# Patient Record
Sex: FEMALE | Race: WHITE | URBAN - METROPOLITAN AREA
[De-identification: names, ages, dates, MRNs, and addresses within clinical notes are randomized per-mention and may not be internally consistent; named-entity substitution may affect disease eponyms.]

---

## 2018-09-21 ENCOUNTER — TRANSFERRED RECORDS (OUTPATIENT)
Dept: HEALTH INFORMATION MANAGEMENT | Facility: CLINIC | Age: 43
End: 2018-09-21

## 2020-01-23 LAB
ALT SERPL-CCNC: 10 U/L (ref 5–25)
AST SERPL-CCNC: 17 U/L (ref 13–39)
CREATININE (EXTERNAL): 1 MG/DL (ref 0.6–1.2)
GFR ESTIMATED (EXTERNAL): >=60 ML/MIN (ref 60–120)
GLUCOSE (EXTERNAL): 71 MG/DL (ref 70–99)
POTASSIUM (EXTERNAL): 4.2 MMOL/L (ref 3.5–5.1)

## 2020-02-26 LAB
ALT SERPL-CCNC: 8 U/L (ref 5–25)
AST SERPL-CCNC: 17 U/L (ref 13–39)
CREATININE (EXTERNAL): 0.9 MG/DL (ref 0.6–1.2)
GFR ESTIMATED (EXTERNAL): >=60 ML/MIN (ref 60–120)
GLUCOSE (EXTERNAL): 91 MG/DL (ref 70–99)
POTASSIUM (EXTERNAL): 3.7 MMOL/L (ref 3.5–5.1)

## 2020-05-26 LAB
ALT SERPL-CCNC: 9 U/L (ref 5–25)
AST SERPL-CCNC: 16 U/L (ref 13–39)
CREATININE (EXTERNAL): 0.9 MG/DL (ref 0.6–1.2)
GFR ESTIMATED (EXTERNAL): >=60 ML/MIN (ref 60–120)
GLUCOSE (EXTERNAL): 86 MG/DL (ref 70–99)
POTASSIUM (EXTERNAL): 3.9 MMOL/L (ref 3.5–5.1)

## 2020-07-29 ENCOUNTER — TRANSFERRED RECORDS (OUTPATIENT)
Dept: HEALTH INFORMATION MANAGEMENT | Facility: CLINIC | Age: 45
End: 2020-07-29

## 2020-07-29 LAB
ALT SERPL-CCNC: 10 U/L (ref 5–25)
AST SERPL-CCNC: 16 U/L (ref 13–39)
CREATININE (EXTERNAL): 0.8 MG/DL (ref 0.6–1.2)
GFR ESTIMATED (EXTERNAL): >=60 ML/MIN (ref 60–120)
GLUCOSE (EXTERNAL): 89 MG/DL (ref 70–99)
POTASSIUM (EXTERNAL): 3.5 MMOL/L (ref 3.5–5.1)

## 2021-10-14 ENCOUNTER — TRANSFERRED RECORDS (OUTPATIENT)
Dept: HEALTH INFORMATION MANAGEMENT | Facility: CLINIC | Age: 46
End: 2021-10-14
Payer: COMMERCIAL

## 2021-10-22 ENCOUNTER — TRANSCRIBE ORDERS (OUTPATIENT)
Dept: OTHER | Age: 46
End: 2021-10-22

## 2021-10-22 DIAGNOSIS — L66.10 LICHEN PLANOPILARIS: Primary | ICD-10-CM

## 2021-10-22 DIAGNOSIS — L66.12 FRONTAL FIBROSING ALOPECIA: ICD-10-CM

## 2022-01-03 NOTE — TELEPHONE ENCOUNTER
FUTURE VISIT INFORMATION      FUTURE VISIT INFORMATION:    Date: 4.1.22    Time: 9:15    [place] [facility] [place]FORMATION:    Referring provider:  Dr. 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 Coy  Path # [id number] Received  Sent to  Scanning. Also put in Dr. Aparicio's [facility] med records tab     Action 3.31.22 jm   Action Taken Called [facility]. They had me fax a records request to [phone]. Received records- sent to scanning. Also put in Dr. Aparicio's [facility] med records tab.

## 2022-04-01 ENCOUNTER — OFFICE VISIT (OUTPATIENT)
Dept: DERMATOLOGY | Facility: CLINIC | Age: 47
End: 2022-04-01
Payer: COMMERCIAL

## 2022-04-01 ENCOUNTER — PRE VISIT (OUTPATIENT)
Dept: DERMATOLOGY | Facility: CLINIC | Age: 47
End: 2022-04-01

## 2022-04-01 DIAGNOSIS — L30.9 DERMATITIS: Primary | ICD-10-CM

## 2022-04-01 DIAGNOSIS — L66.10 LICHEN PLANOPILARIS: ICD-10-CM

## 2022-04-01 PROCEDURE — 11901 INJECT SKIN LESIONS >7: CPT | Mod: GC | Performed by: DERMATOLOGY

## 2022-04-01 PROCEDURE — 99203 OFFICE O/P NEW LOW 30 MIN: CPT | Mod: 25 | Performed by: DERMATOLOGY

## 2022-04-01 RX ORDER — PREDNISOLONE ACETATE 10 MG/ML
SUSPENSION/ DROPS OPHTHALMIC
COMMUNITY
Start: 2021-12-06

## 2022-04-01 RX ORDER — FLUOCINONIDE TOPICAL SOLUTION USP, 0.05% 0.5 MG/ML
SOLUTION TOPICAL
COMMUNITY
Start: 2022-03-22 | End: 2024-06-14

## 2022-04-01 RX ORDER — FINASTERIDE 5 MG/1
TABLET, FILM COATED ORAL
COMMUNITY
Start: 2022-03-29 | End: 2023-05-19

## 2022-04-01 RX ORDER — FLUOCINOLONE ACETONIDE 0.11 MG/ML
OIL TOPICAL 2 TIMES DAILY
Qty: 60 ML | Refills: 5 | Status: SHIPPED | OUTPATIENT
Start: 2022-04-01 | End: 2022-04-01

## 2022-04-01 RX ORDER — HYDROXYCHLOROQUINE SULFATE 200 MG/1
TABLET, FILM COATED ORAL
COMMUNITY
Start: 2022-03-22 | End: 2023-10-06

## 2022-04-01 RX ORDER — TACROLIMUS 1 MG/G
OINTMENT TOPICAL
Qty: 60 G | Refills: 6 | Status: SHIPPED | OUTPATIENT
Start: 2022-04-01

## 2022-04-01 RX ORDER — VALACYCLOVIR HYDROCHLORIDE 500 MG/1
500 TABLET, FILM COATED ORAL DAILY
COMMUNITY
Start: 2022-02-11

## 2022-04-01 RX ORDER — KETOCONAZOLE 20 MG/ML
SHAMPOO TOPICAL DAILY PRN
Qty: 120 ML | Refills: 11 | Status: SHIPPED | OUTPATIENT
Start: 2022-04-01 | End: 2023-05-19

## 2022-04-01 RX ORDER — MINOXIDIL 2.5 MG/1
TABLET ORAL
Qty: 60 TABLET | Refills: 1 | Status: SHIPPED | OUTPATIENT
Start: 2022-04-01 | End: 2022-08-05

## 2022-04-01 ASSESSMENT — PAIN SCALES - GENERAL: PAINLEVEL: NO PAIN (0)

## 2022-04-01 NOTE — PATIENT INSTRUCTIONS
- Start ketoconazole 3x per week  - Start oral minoxidil 1.25mg daily, can stop topical minoxidil   - Start topical tacrolimus to scalp nightly during weekdays, fluocinonide solution on weekends   - Increase fluocinonide use if things are flaring as needed  - Continue all other oral medications  - Consider Excimer laser or repeat ILK injections at lower dose (2.5 or 5mg/cc) or isotretinoin/acitretin

## 2022-04-01 NOTE — LETTER
4/1/2022       RE: Maria Ines Coreas  [address] [place] [address]     Dear Colleague,    Thank you for referring your patient, Maria Ines Coreas, to the [facility] DERMATOLOGY CLINIC [place] at [facility]. Please see a copy of my visit note below.    [facility] Dermatology Note  Encounter Date: Apr 1, 2022  Office Visit     Dermatology Problem List:  1.  FFA/LPP. Biopsies: 2018.   - Current tx: finasteride, naltrexone, HCQ, oral minoxidil, ILK 2.5 04/01/22   - Adjunct tx: topical tacrolimus, ketoconazole, fluocinonide   - Future considerations: excimer, isotretinoin/ acitretin     ____________________________________________    Assessment & Plan:   1. 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   Patient with longstanding history of lichen plano pilaris/FFA.  Biopsy-proven in 2018 and has been on several systemic medication options as described below.  With progression involving eyebrows and body hair. Main disease activity primarily localized on the frontal scalp but overall well controlled.  Discussed etiology and natural history of her condition at length today, as well as treatment options and the rationale for use. Recommended initiation of ketoconazole shampoo to help treat scalp inflammation/scale, start topical tacrolimus. ILK today at lower concentration d/t c/f atrophy related changes. Will also start oral minoxidil to promote further hair regrowth. Patient agreeable to plan.  - Start ketoconazole 2% shampoo in the shower 3x/week.   - Start topical tacrolimus to scalp given concern for topical CS SE  - Continue topical fluocinonide --> on weekends  - Start oral minoxidil 1.25mg daily, SE of hypertrichosis, swelling, and lightheadedness discussed.  - ILK today, see procedure note below  - Continue finasteride  - Continue low dose naltrexone  - Continue hydroxychloroquine  -  Discussed other options including excimer laser, oral isotretinoin      Procedures Performed:   - Intra-lesional triamcinolone procedure note. After positioning and cleansing with isopropyl alcohol, 1 total mL of triamcinolone 2.5 mg/mL was injected into 10 lesion(s) on the scalp. The patient tolerated the procedure well and left the dermatology clinic in good condition.      Follow-up: 4 month(s) in-person, or earlier for new or changing lesions    Staff and Resident:     Arvin Hurd MD  PGY-2 Dermatology  Pager: [phone]     I have personally examined this patient and agree with the resident doctor's documentation and plan of care. I have reviewed and amended the resident's note above. The documentation accurately reflects my clinical observations, diagnoses, treatment and follow-up plans. I was present for key portions of the procedure.       Renee Aparicio MD  Dermatology Staff    ____________________________________________    CC: Hair Loss (pt staes she is here for a hair loss on going. X 5 years )    HPI:  Ms. Maria Ines Coreas is a 46 year old female who presents as a new patient for evaluation of hair loss.   - Reason for referral: LPP/FFA  - Onset of hair loss: 2018  - Affected areas: initially the temples then progressed to eyebrows and body hair  - Shedding or thinning, or both: both   - Percentage of hair loss: 50%  No Scalp pain   No Scalp burning   Yes Scalp itching    Yes Eyebrow changes    Yes, some thinning Eyelash changes   No Beard changes    Yes Other body hair changes    No Nail changes    No Additional symptoms? (please list below)     - Current treatments:    - finasteride 5mg   - low dose naltrexone   - /200mg alternate    - topical minoxidil evernight   - fluocinonide solution every night  - Prior discontinued treatments:    - stopped mycophenolate ~2 years ago, feeling shortness of breath   - Prior biopsies: 9/25/2018 (records available: yes)  - Scalp or hair care  habits/products:    - - - Which products? How many times per week? everyday, with trey    - - - Frequency of hair sprays, gels, dyes, heat styling, perms, weaves or extensions? n/a  - - - Sunscreen use on face or scalp? If so, what brand? Yes, whatever is on sale  - Menses: regular and without heavy flow post-menopausal: perimenopausal   - Unwanted hair growth/hirsutism: none  - Diet (meat, vegetarian, mixed): regular  - Recent weight loss: none  - Personal history of thyroid dysfunction or autoimmune disease: none  - Family history of hair loss: none  - Family history of autoimmune disease: none  - Major family, financial, school/work, or other social stressors in the few months prior to hair loss: none  - Overall things have been worsening, hair line is going back, thinning over the scalp  Patient is otherwise feeling well, in usual state of health, and has no additional skin concerns today.     Labs:      Physical Exam:  Vitals: There were no vitals taken for this visit.  GEN: Well developed, well-nourished, in no acute distress, in a pleasant mood.    SKIN: Focused examination of face and scalp was performed.  - Hernandez type: 1  - Last part width of 1.2/1.1/1.1  - Patchy erythema on frontal scalp with evidence of telangectasis  - Mild perifollicular erythema on frontal scalp  - Mild perifollicular scale on frontal scalp  - Mild loose scaling of the scalp   - Negative hair pull test   - Decreased eyelash density  - Normal to decreased eyebrow density  - No nail pitting or dystrophy   - No scalp folliculitis/pustules   - No other lesions of concern on areas examined.     Medications:  Current Outpatient Medications   Medication     finasteride (PROSCAR) 5 MG tablet     fluocinonide (LIDEX) 0.05 % external solution     hydroxychloroquine (PLAQUENIL) 200 MG tablet     prednisoLONE acetate (PRED FORTE) 1 % ophthalmic suspension     valACYclovir (VALTREX) 500 MG tablet     No current facility-administered  medications for this visit.      Past Medical History:   There is no problem list on file for this patient.    No past medical history on file.    CC Referred Self, MD  No address on file on close of this encounter.

## 2022-04-01 NOTE — NURSING NOTE
Drug Administration Record    Prior to injection, verified patient identity using patient's name and date of birth.  Due to injection administration, patient instructed to remain in clinic for 15 minutes  afterwards, and to report any adverse reaction to me immediately.    Drug Name: triamcinolone acetonide(kenalog)  Dose: 1mL of triamcinolone 2.5mg/mL  Route administered: ID  NDC #: Kenalog-10 (1119-3754-18)  Amount of waste(mL):4.75  Reason for waste: Single use vial    LOT #: [id number]  SITE: see providers notes   EXPIRATION DATE: may 2023

## 2022-06-17 ENCOUNTER — TELEPHONE (OUTPATIENT)
Dept: DERMATOLOGY | Facility: CLINIC | Age: 47
End: 2022-06-17
Payer: COMMERCIAL

## 2022-06-17 NOTE — TELEPHONE ENCOUNTER
[facility] Call Center    Phone Message    May a detailed message be left on voicemail: yes     Reason for Call: Other: Pt called and was wondering what is the next opening.  There is a chance the pt might be out of town. Per protocol, the clinic will need to review. Please call her back. Thanks     Action Taken: Message routed to:  [facility] DERM    Travel Screening: Not Applicable

## 2022-06-17 NOTE — TELEPHONE ENCOUNTER
Spoke with patient informed her of the next available appointment. She will be keeping her current appointment.    Amandeep Melgoza CMA

## 2022-08-05 ENCOUNTER — LAB (OUTPATIENT)
Dept: LAB | Facility: CLINIC | Age: 47
End: 2022-08-05
Payer: COMMERCIAL

## 2022-08-05 ENCOUNTER — OFFICE VISIT (OUTPATIENT)
Dept: DERMATOLOGY | Facility: CLINIC | Age: 47
End: 2022-08-05
Payer: COMMERCIAL

## 2022-08-05 DIAGNOSIS — L66.10 LICHEN PLANOPILARIS: Primary | ICD-10-CM

## 2022-08-05 DIAGNOSIS — L66.10 LICHEN PLANOPILARIS: ICD-10-CM

## 2022-08-05 DIAGNOSIS — Z51.81 MEDICATION MONITORING ENCOUNTER: ICD-10-CM

## 2022-08-05 LAB
ALBUMIN SERPL-MCNC: 4 G/DL (ref 3.4–5)
ALP SERPL-CCNC: 60 U/L (ref 40–150)
ALT SERPL W P-5'-P-CCNC: 20 U/L (ref 0–50)
ANION GAP SERPL CALCULATED.3IONS-SCNC: 9 MMOL/L (ref 3–14)
AST SERPL W P-5'-P-CCNC: 26 U/L (ref 0–45)
BASOPHILS # BLD AUTO: 0 10E3/UL (ref 0–0.2)
BASOPHILS NFR BLD AUTO: 1 %
BILIRUB SERPL-MCNC: 0.7 MG/DL (ref 0.2–1.3)
BUN SERPL-MCNC: 9 MG/DL (ref 7–30)
CALCIUM SERPL-MCNC: 9.1 MG/DL (ref 8.5–10.1)
CHLORIDE BLD-SCNC: 107 MMOL/L (ref 94–109)
CO2 SERPL-SCNC: 27 MMOL/L (ref 20–32)
CREAT SERPL-MCNC: 0.86 MG/DL (ref 0.52–1.04)
EOSINOPHIL # BLD AUTO: 0.1 10E3/UL (ref 0–0.7)
EOSINOPHIL NFR BLD AUTO: 1 %
ERYTHROCYTE [DISTWIDTH] IN BLOOD BY AUTOMATED COUNT: 12.9 % (ref 10–15)
GFR SERPL CREATININE-BSD FRML MDRD: 84 ML/MIN/1.73M2
GLUCOSE BLD-MCNC: 94 MG/DL (ref 70–99)
HCT VFR BLD AUTO: 38.3 % (ref 35–47)
HGB BLD-MCNC: 12.5 G/DL (ref 11.7–15.7)
IMM GRANULOCYTES # BLD: 0 10E3/UL
IMM GRANULOCYTES NFR BLD: 0 %
LYMPHOCYTES # BLD AUTO: 1.5 10E3/UL (ref 0.8–5.3)
LYMPHOCYTES NFR BLD AUTO: 35 %
MCH RBC QN AUTO: 28.7 PG (ref 26.5–33)
MCHC RBC AUTO-ENTMCNC: 32.6 G/DL (ref 31.5–36.5)
MCV RBC AUTO: 88 FL (ref 78–100)
MONOCYTES # BLD AUTO: 0.3 10E3/UL (ref 0–1.3)
MONOCYTES NFR BLD AUTO: 6 %
NEUTROPHILS # BLD AUTO: 2.6 10E3/UL (ref 1.6–8.3)
NEUTROPHILS NFR BLD AUTO: 57 %
NRBC # BLD AUTO: 0 10E3/UL
NRBC BLD AUTO-RTO: 0 /100
PLATELET # BLD AUTO: 273 10E3/UL (ref 150–450)
POTASSIUM BLD-SCNC: 3.9 MMOL/L (ref 3.4–5.3)
PROT SERPL-MCNC: 7.1 G/DL (ref 6.8–8.8)
RBC # BLD AUTO: 4.35 10E6/UL (ref 3.8–5.2)
SODIUM SERPL-SCNC: 143 MMOL/L (ref 133–144)
WBC # BLD AUTO: 4.5 10E3/UL (ref 4–11)

## 2022-08-05 PROCEDURE — 80053 COMPREHEN METABOLIC PANEL: CPT | Performed by: PATHOLOGY

## 2022-08-05 PROCEDURE — 36415 COLL VENOUS BLD VENIPUNCTURE: CPT | Performed by: PATHOLOGY

## 2022-08-05 PROCEDURE — 85025 COMPLETE CBC W/AUTO DIFF WBC: CPT | Performed by: PATHOLOGY

## 2022-08-05 PROCEDURE — 99214 OFFICE O/P EST MOD 30 MIN: CPT | Mod: 25 | Performed by: DERMATOLOGY

## 2022-08-05 PROCEDURE — 11900 INJECT SKIN LESIONS </W 7: CPT | Performed by: DERMATOLOGY

## 2022-08-05 RX ORDER — BIMATOPROST 0.3 MG/ML
SOLUTION/ DROPS OPHTHALMIC
Qty: 5 ML | Refills: 6 | Status: SHIPPED | OUTPATIENT
Start: 2022-08-05 | End: 2024-02-04

## 2022-08-05 RX ORDER — MINOXIDIL 2.5 MG/1
2.5 TABLET ORAL DAILY
Qty: 90 TABLET | Refills: 3 | Status: SHIPPED | OUTPATIENT
Start: 2022-08-05 | End: 2023-05-19

## 2022-08-05 ASSESSMENT — PAIN SCALES - GENERAL: PAINLEVEL: NO PAIN (0)

## 2022-08-05 NOTE — LETTER
8/5/2022       RE: Maria Ines Coreas  [address] [place] [address]     Dear Colleague,    Thank you for referring your patient, Maria Ines Coreas, to the [facility] DERMATOLOGY CLINIC [place] at [facility]. Please see a copy of my visit note below.    [facility] Dermatology Note  Encounter Date: Aug 5, 2022  Office Visit     Dermatology Problem List:  1.  FFA/LPP. Biopsies: 2018.   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   Patient with longstanding history of lichen plano pilaris/FFA.  Biopsy-proven in 2018 and has been on several systemic medication options as described below.  With progression involving eyebrows and body hair. Main disease activity primarily localized on the frontal scalp but overall well controlled.  Discussed etiology and future treatment options, including the rationale for use. Ketoconazole shampoo has been helping scalp inflammation/scale. Pt did not start topical tacrolimus last visit due to lack of coverage.  - ILK today as below in procedure note, can get subsequent injections at home derm clinic every 2 months   - Start latisse drops: apply to eyebrows daily  - Increase oral minoxidil to 2.5mg daily, no side effects noted  - Continue ketoconazole 2% shampoo in the shower 3x/week.   - Continue topical fluocinonide daily  - Continue finasteride  - Continue hydroxychloroquine: CBC, CMP today. Up to date on eye exam.  - Stop low dose naltrexone  - Discussed other options including excimer laser, oral isotretinoin       Procedures Performed:   - Intra-lesional triamcinolone procedure  note. After verbal consent and review of risk of pain and skin thinning/atrophy, positioning and cleansing with isopropyl alcohol, 1 total mL of triamcinolone 5 mg/mL was injected into 10 lesions on the frontal scalp. The patient tolerated the procedure well and left the dermatology clinic in good condition.  - Procedure(s) performed by faculty.     Follow-up: in 4 months in person with me, or earlier for new/changing lesions. Also with her dermatology clinic at home for Kenalog injections every 2 months.     Staff and Medical Student:     Nuria Worley, MS3    I was present with the medical student who participated in the service and in the documentation of the note. I have verified the history and personally performed physical exam and medical decision making. I agree with the assessment and plan of care as documented in the note. I performed the procedure(s).     Renee Aparicio MD   of Dermatology  [facility]    ____________________________________________    CC: Hair Loss (Maria Ines is here for a follow up on hair loss.  Still losing hair.)    HPI:  Ms. Maria Ines Coreas is a 46 year old female who presents today as a return patient for follow up on hair loss. Last seen 4/1/22 and received ILK 2.5 mg, started ketoconazole shampoo, started oral minoxidil, started topical tacrolimus, and was continued on finasteride, low dose naltrexone, and hydroxychloroquine.     Maria Ines reports less flakiness and itching of her scalp with the ketoconazole shampoo. No patches or areas of hair loss but some ongoing shedding. Overall doesn't notice much improvement since her last visit, but does think the injections helped short-term. Her eyebrows are quite scarce, she has never tried using Latisse. She never got the topical tacrolimus due to lack of insurance coverage and the medication being expensive. Instead, she has been using the topical fluocinonide every day rather than just on the  weekends. She has been taking the oral minoxidil regularly and denies any side effects. She wonders if there is any value in keeping the low dose naltrexone because her insurance does not cover that either. No side effects from the Plaquenil either, hasn't had labs drawn in over a year as far as she can remember.     Patient is otherwise feeling well, without additional skin concerns.    Labs:  None reviewed.    Physical Exam:  Vitals: There were no vitals taken for this visit.  SKIN: Focused examination of the scalp was performed.  - area of mild perifollicular erythema and scale on frontal scalp   - decreased eyebrow density  - No other lesions of concern on areas examined.     Medications:  Current Outpatient Medications   Medication     finasteride (PROSCAR) 5 MG tablet     fluocinonide (LIDEX) 0.05 % external solution     hydroxychloroquine (PLAQUENIL) 200 MG tablet     ketoconazole (NIZORAL) 2 % external shampoo     minoxidil (LONITEN) 2.5 MG tablet     prednisoLONE acetate (PRED FORTE) 1 % ophthalmic suspension     tacrolimus (PROTOPIC) 0.1 % external ointment     valACYclovir (VALTREX) 500 MG tablet     Current Facility-Administered Medications   Medication     triamcinolone acetonide (KENALOG-10) injection 5 mg      Past Medical History:   There is no problem list on file for this patient.    No past medical history on file.     CC No referring provider defined for this encounter. on close of this encounter.

## 2022-08-05 NOTE — NURSING NOTE
Dermatology Rooming Note    Maria Ines Coreas's goals for this visit include:   Chief Complaint   Patient presents with     Hair Loss     Maria Ines is here for a follow up on hair loss.  Still losing hair.     Merced Clemons, CMA

## 2022-08-05 NOTE — PATIENT INSTRUCTIONS
Plan:  - stop naltrexone  - start latisse drops: apply to eyebrows  - continue ketoconazole 2% shampoo 3x weekly  - continue topical fluocinonide daily  - continue finasteride  - continue hydroxychloroquine  - increase minoxidil to 2.5 mg daily  - bloodwork done today: CBC, CMP  - follow up in 4 months  - contact at home dermatologist for Kenalog injections in 2 months

## 2022-08-05 NOTE — NURSING NOTE
Drug Administration Record    Prior to injection, verified patient identity using patient's name and date of birth.  Due to injection administration, patient instructed to remain in clinic for 15 minutes  afterwards, and to report any adverse reaction to me immediately.    Drug Name: triamcinolone acetonide(kenalog)  Dose: 2mL of triamcinolone 5mg/mL, 10mg dose  Route administered: ID  NDC #: Kenalog-10 ([id number])  Amount of waste(mL):4mL  Reason for waste: Multi dose vial    LOT #: [id number]   SITE: scalp  : [employer]  EXPIRATION DATE: 02/2024

## 2022-08-05 NOTE — PROGRESS NOTES
[facility] Dermatology Note  Encounter Date: Aug 5, 2022  Office Visit     Dermatology Problem List:  1.  FFA/LPP. Biopsies: 2018.   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   Patient with longstanding history of lichen plano pilaris/FFA.  Biopsy-proven in 2018 and has been on several systemic medication options as described below.  With progression involving eyebrows and body hair. Main disease activity primarily localized on the frontal scalp but overall well controlled.  Discussed etiology and future treatment options, including the rationale for use. Ketoconazole shampoo has been helping scalp inflammation/scale. Pt did not start topical tacrolimus last visit due to lack of coverage.  - ILK today as below in procedure note, can get subsequent injections at home derm clinic every 2 months   - Start latisse drops: apply to eyebrows daily  - Increase oral minoxidil to 2.5mg daily, no side effects noted  - Continue ketoconazole 2% shampoo in the shower 3x/week.   - Continue topical fluocinonide daily  - Continue finasteride  - Continue hydroxychloroquine: CBC, CMP today. Up to date on eye exam.  - Stop low dose naltrexone  - Discussed other options including excimer laser, oral isotretinoin       Procedures Performed:   - Intra-lesional triamcinolone procedure note. After verbal consent and review of risk of pain and skin thinning/atrophy, positioning and cleansing with isopropyl alcohol, 1 total mL of triamcinolone 5 mg/mL was injected into 10 lesions on the frontal scalp. The patient tolerated the procedure well and left the dermatology clinic in good condition.  - Procedure(s)  performed by faculty.     Follow-up: in 4 months in person with me, or earlier for new/changing lesions. Also with her dermatology clinic at home for Kenalog injections every 2 months.     Staff and Medical Student:     Nuria Worley, MS3    I was present with the medical student who participated in the service and in the documentation of the note. I have verified the history and personally performed physical exam and medical decision making. I agree with the assessment and plan of care as documented in the note. I performed the procedure(s).     Renee Aparicio MD   of Dermatology  [facility]    ____________________________________________    CC: Hair Loss (Maria Ines is here for a follow up on hair loss.  Still losing hair.)    HPI:  Ms. Maria Ines Coreas is a 46 year old female who presents today as a return patient for follow up on hair loss. Last seen 4/1/22 and received ILK 2.5 mg, started ketoconazole shampoo, started oral minoxidil, started topical tacrolimus, and was continued on finasteride, low dose naltrexone, and hydroxychloroquine.     Maria Ines reports less flakiness and itching of her scalp with the ketoconazole shampoo. No patches or areas of hair loss but some ongoing shedding. Overall doesn't notice much improvement since her last visit, but does think the injections helped short-term. Her eyebrows are quite scarce, she has never tried using Latisse. She never got the topical tacrolimus due to lack of insurance coverage and the medication being expensive. Instead, she has been using the topical fluocinonide every day rather than just on the weekends. She has been taking the oral minoxidil regularly and denies any side effects. She wonders if there is any value in keeping the low dose naltrexone because her insurance does not cover that either. No side effects from the Plaquenil either, hasn't had labs drawn in over a year as far as she can remember.     Patient is  otherwise feeling well, without additional skin concerns.    Labs:  None reviewed.    Physical Exam:  Vitals: There were no vitals taken for this visit.  SKIN: Focused examination of the scalp was performed.  - area of mild perifollicular erythema and scale on frontal scalp   - decreased eyebrow density  - No other lesions of concern on areas examined.     Medications:  Current Outpatient Medications   Medication     finasteride (PROSCAR) 5 MG tablet     fluocinonide (LIDEX) 0.05 % external solution     hydroxychloroquine (PLAQUENIL) 200 MG tablet     ketoconazole (NIZORAL) 2 % external shampoo     minoxidil (LONITEN) 2.5 MG tablet     prednisoLONE acetate (PRED FORTE) 1 % ophthalmic suspension     tacrolimus (PROTOPIC) 0.1 % external ointment     valACYclovir (VALTREX) 500 MG tablet     Current Facility-Administered Medications   Medication     triamcinolone acetonide (KENALOG-10) injection 5 mg      Past Medical History:   There is no problem list on file for this patient.    No past medical history on file.     CC No referring provider defined for this encounter. on close of this encounter.

## 2022-10-03 ENCOUNTER — HEALTH MAINTENANCE LETTER (OUTPATIENT)
Age: 47
End: 2022-10-03

## 2023-01-20 ENCOUNTER — OFFICE VISIT (OUTPATIENT)
Dept: DERMATOLOGY | Facility: CLINIC | Age: 48
End: 2023-01-20
Payer: COMMERCIAL

## 2023-01-20 VITALS — DIASTOLIC BLOOD PRESSURE: 67 MMHG | HEART RATE: 51 BPM | SYSTOLIC BLOOD PRESSURE: 124 MMHG

## 2023-01-20 DIAGNOSIS — L66.10 LICHEN PLANOPILARIS: Primary | ICD-10-CM

## 2023-01-20 DIAGNOSIS — Z51.81 MEDICATION MONITORING ENCOUNTER: ICD-10-CM

## 2023-01-20 PROCEDURE — 11901 INJECT SKIN LESIONS >7: CPT | Performed by: DERMATOLOGY

## 2023-01-20 PROCEDURE — 99213 OFFICE O/P EST LOW 20 MIN: CPT | Mod: 25 | Performed by: DERMATOLOGY

## 2023-01-20 RX ORDER — HYDROCORTISONE 2.5 %
CREAM (GRAM) TOPICAL
Qty: 30 G | Refills: 3 | Status: SHIPPED | OUTPATIENT
Start: 2023-01-20

## 2023-01-20 RX ORDER — CLOBETASOL PROPIONATE 0.5 MG/ML
SOLUTION TOPICAL
Qty: 50 ML | Refills: 3 | Status: SHIPPED | OUTPATIENT
Start: 2023-01-20 | End: 2023-05-19

## 2023-01-20 ASSESSMENT — PAIN SCALES - GENERAL: PAINLEVEL: NO PAIN (0)

## 2023-01-20 NOTE — LETTER
1/20/2023       RE: Maria Ines Coreas  [address] [place] [address]     Dear Colleague,    Thank you for referring your patient, Maria Ines Coreas, to the [facility] DERMATOLOGY CLINIC [place] at [facility]. Please see a copy of my visit note below.    Dermatology Problem List:  1.  FFA/LPP. Biopsies: 2018.   - Current tx: finasteride, HCQ, oral minoxidil  - ILK:  2.5mg on 4/01/22, 5mg on 8/5/22  - Adjunct tx: ketoconazole, fluocinonide, Latisse (brows)   - Future considerations: excimer, isotretinoin/acitretin      ____________________________________________    DERMATOLOGY CLINIC VISIT NOTE     ASSESSMENT AND PLAN:    1.  Frontal fibrosing alopecia lichen planopilaris: Currently active and flaring in the last 2 months.    -A total of 1 mL of intralesional Kenalog 5 mg/mL injected into 10 sites along the frontal hairline.    -The patient to continue Latisse to the eyebrow area.  I prescribed hydrocortisone 2.5% cream to be used up to twice daily on the eyebrows.    -Continue oral minoxidil at 2.5 mg daily.  No significant side effects noted.    -Continue ketoconazole 2% shampoo 3 times per week.  -Transition from fluocinonide to clobetasol solution nightly as needed for flares.    -Continue finasteride.    -Continue hydroxychloroquine.  Last labs in August.  -Start home narrow-band UVB.  We will order a handheld device to be used 2-3 times per week.    The patient to follow up in 4 months' time and see her local dermatologist in 2 months' time.    I have personally examined this patient and agree with the resident doctor's documentation and plan of care. I have reviewed and amended the resident's note above. The documentation accurately reflects my clinical observations, diagnoses, treatment and follow-up plans.     Renee Aparicio MD  Dermatology Staff    Renee Aparicio MD   of Dermatology  [facility]  [place]    _______________________________________  _______________________________________      HISTORY OF PRESENT ILLNESS:  Ms. Coreas is a 47-year-old female returning to Dermatology Clinic for ongoing treatment of her lichen planopilaris.  She states that she has had a flare over the last 2 months involving the frontocentral and left frontal scalp.  She has been using medications as prescribed.  She is interested in discussing ongoing treatment options.  She has not noticed increased facial hair related to the oral minoxidil.  No lightheadedness or dizziness.  No other side effects from medications.    PHYSICAL EXAMINATION:    GENERAL:  The patient is a healthy-appearing, 47-year-old female in no distress.  SKIN:  Exam included the face and the scalp.  Examination of the eyebrows shows decreased density with broken hair shafts.  Examination of the central and left frontal hairline with erythematous patches with follicularly based scaling. On the right frontal hairline, there is erythema and scale to a lesser degree.      Again, thank you for allowing me to participate in the care of your patient.      Sincerely,    Renee Aparicio MD

## 2023-01-20 NOTE — PROGRESS NOTES
Dermatology Problem List:  1.  FFA/LPP. Biopsies: 2018.   - Current tx: finasteride, HCQ, oral minoxidil  - ILK:  2.5mg on 4/01/22, 5mg on 8/5/22  - Adjunct tx: ketoconazole, fluocinonide, Latisse (brows)   - Future considerations: excimer, isotretinoin/acitretin      ____________________________________________    DERMATOLOGY CLINIC VISIT NOTE     ASSESSMENT AND PLAN:    1.  Frontal fibrosing alopecia lichen planopilaris: Currently active and flaring in the last 2 months.    -A total of 1 mL of intralesional Kenalog 5 mg/mL injected into 10 sites along the frontal hairline.    -The patient to continue Latisse to the eyebrow area.  I prescribed hydrocortisone 2.5% cream to be used up to twice daily on the eyebrows.    -Continue oral minoxidil at 2.5 mg daily.  No significant side effects noted.    -Continue ketoconazole 2% shampoo 3 times per week.  -Transition from fluocinonide to clobetasol solution nightly as needed for flares.    -Continue finasteride.    -Continue hydroxychloroquine.  Last labs in August.  -Start home narrow-band UVB.  We will order a handheld device to be used 2-3 times per week.    The patient to follow up in 4 months' time and see her local dermatologist in 2 months' time.    I have personally examined this patient and agree with the resident doctor's documentation and plan of care. I have reviewed and amended the resident's note above. The documentation accurately reflects my clinical observations, diagnoses, treatment and follow-up plans.     Renee Aparicio MD  Dermatology Staff    Renee Aparicio MD   of Dermatology  [facility]    _______________________________________  _______________________________________      HISTORY OF PRESENT ILLNESS:  Ms. Coreas is a 47-year-old female returning to Dermatology Clinic for ongoing treatment of her lichen planopilaris.  She states that she has had a flare over the last 2 months involving the frontocentral and left  frontal scalp.  She has been using medications as prescribed.  She is interested in discussing ongoing treatment options.  She has not noticed increased facial hair related to the oral minoxidil.  No lightheadedness or dizziness.  No other side effects from medications.    PHYSICAL EXAMINATION:    GENERAL:  The patient is a healthy-appearing, 47-year-old female in no distress.  SKIN:  Exam included the face and the scalp.  Examination of the eyebrows shows decreased density with broken hair shafts.  Examination of the central and left frontal hairline with erythematous patches with follicularly based scaling. On the right frontal hairline, there is erythema and scale to a lesser degree.

## 2023-01-20 NOTE — NURSING NOTE
Drug Administration Record    Prior to injection, verified patient identity using patient's name and date of birth.  Due to injection administration, patient instructed to remain in clinic for 15 minutes  afterwards, and to report any adverse reaction to me immediately.    Drug Name: triamcinolone acetonide(kenalog)  Dose: 1mL of triamcinolone 5mg/mL, 5mg dose  Route administered: ID  NDC #: Kenalog-10 (9851-4044-57)  Amount of waste(mL):4.5mL  Reason for waste: Multi dose vial    LOT #: [id number]  SITE: see provider's note  : [employer]  EXPIRATION DATE: 08/2024

## 2023-05-19 ENCOUNTER — OFFICE VISIT (OUTPATIENT)
Dept: DERMATOLOGY | Facility: CLINIC | Age: 48
End: 2023-05-19
Payer: COMMERCIAL

## 2023-05-19 ENCOUNTER — MYC MEDICAL ADVICE (OUTPATIENT)
Dept: DERMATOLOGY | Facility: CLINIC | Age: 48
End: 2023-05-19

## 2023-05-19 ENCOUNTER — TELEPHONE (OUTPATIENT)
Dept: DERMATOLOGY | Facility: CLINIC | Age: 48
End: 2023-05-19

## 2023-05-19 VITALS — HEART RATE: 60 BPM | SYSTOLIC BLOOD PRESSURE: 110 MMHG | DIASTOLIC BLOOD PRESSURE: 70 MMHG

## 2023-05-19 DIAGNOSIS — L66.10 LICHEN PLANOPILARIS: Primary | ICD-10-CM

## 2023-05-19 DIAGNOSIS — L30.9 DERMATITIS: ICD-10-CM

## 2023-05-19 PROCEDURE — 11901 INJECT SKIN LESIONS >7: CPT | Performed by: DERMATOLOGY

## 2023-05-19 RX ORDER — CLOBETASOL PROPIONATE 0.5 MG/ML
SOLUTION TOPICAL
Qty: 50 ML | Refills: 3 | Status: SHIPPED | OUTPATIENT
Start: 2023-05-19 | End: 2024-06-14

## 2023-05-19 RX ORDER — KETOCONAZOLE 20 MG/ML
SHAMPOO TOPICAL DAILY PRN
Qty: 120 ML | Refills: 11 | Status: SHIPPED | OUTPATIENT
Start: 2023-05-19 | End: 2024-06-14

## 2023-05-19 RX ORDER — FINASTERIDE 5 MG/1
TABLET, FILM COATED ORAL
Qty: 90 TABLET | Refills: 3 | Status: SHIPPED | OUTPATIENT
Start: 2023-05-19 | End: 2024-06-14

## 2023-05-19 RX ORDER — MINOXIDIL 2.5 MG/1
2.5 TABLET ORAL DAILY
Qty: 90 TABLET | Refills: 3 | Status: SHIPPED | OUTPATIENT
Start: 2023-05-19 | End: 2024-06-11

## 2023-05-19 ASSESSMENT — PAIN SCALES - GENERAL: PAINLEVEL: NO PAIN (0)

## 2023-05-19 NOTE — LETTER
5/19/2023       RE: Maria Ines Coreas  [address] [place] [place] [address]     Dear Colleague,    Thank you for referring your patient, Maria Ines Coreas, to the [facility] [place] at [facility]. Please see a copy of my visit note below.    Dermatology Problem List:  1.  FFA/LPP. Biopsies: 2018.   - Current tx: finasteride, HCQ, oral minoxidil  - ILK:  2.5mg on 4/01/22, 5mg on 8/5/22  - Adjunct tx: ketoconazole, fluocinonide, Latisse (brows)   - Future considerations: excimer, isotretinoin/acitretin   - nbUVB device not covered     ____________________________________________    DERMATOLOGY CLINIC VISIT NOTE     ASSESSMENT AND PLAN:    1.  Frontal fibrosing alopecia lichen planopilaris: Chronic and mildly active today. Small area of atrophy on the central frontal hairline. Good regrowth noted.     -A total of 1 mL of intralesional Kenalog 5 mg/mL injected into 10 sites along the frontal hairline and anterior vertex scalp  -Continue oral minoxidil at 2.5 mg daily.  No significant side effects noted.    -Continue ketoconazole 2% shampoo 3 times per week.   -Clobetasol solution nightly as needed for flares.    -Continue finasteride.    -Continue hydroxychloroquine.      The patient to follow up in 4 months' time and see her local dermatologist in 2 months' time.      Renee Aparicio MD   of Dermatology  [facility]    _______________________________________  _______________________________________      HISTORY OF PRESENT ILLNESS:  Ms. Coreas is a 47-year-old female returning to Dermatology Clinic for ongoing treatment of her lichen planopilaris.  Continues to follow with her local dermatologist for intralesional kenalog as well. Notes increased regrowth. Has some areas of atrophy.     PHYSICAL EXAMINATION:    GENERAL:  The patient is a healthy-appearing, 47-year-old female in no distress.  SKIN:  Exam  included the face and the scalp.  Examination of the eyebrows shows decreased density.  Examination of the central and left frontal hairline with erythematous patches with follicularly based scaling.  Extensive new fibers in the part 2-4 cm in length.   Small 3 mm patch of decreased density on the frontal anterior hairline with telangiectasia

## 2023-05-19 NOTE — TELEPHONE ENCOUNTER
[facility] Call Center    Phone Message    May a detailed message be left on voicemail: no     Reason for Call: Medication Question or concern regarding medication   Prescription Clarification  Name of Medication: minoxidil (LONITEN) 2.5 MG tablet  Prescribing Provider: Renee Aparicio MD   Pharmacy: [id number] - [place], [place] - [address] [facility]   What on the order needs clarification? Pharmacist states directions need to be clarified. Two sets of directions on Rx.     Action Taken: Message routed to:  [facility] Derm    Travel Screening: Not Applicable

## 2023-05-19 NOTE — NURSING NOTE
Dermatology Rooming Note    Maria Ines Coreas's goals for this visit include:   Chief Complaint   Patient presents with     Hair Loss     Frontal fibrosing alopecia lichen planopilaris: hasn't noticed any flaring, per patient     Letitia Roblero LPN

## 2023-05-19 NOTE — PROGRESS NOTES
Dermatology Problem List:  1.  FFA/LPP. Biopsies: 2018.   - Current tx: finasteride, HCQ, oral minoxidil  - ILK:  2.5mg on 4/01/22, 5mg on 8/5/22  - Adjunct tx: ketoconazole, fluocinonide, Latisse (brows)   - Future considerations: excimer, isotretinoin/acitretin   - nbUVB device not covered     ____________________________________________    DERMATOLOGY CLINIC VISIT NOTE     ASSESSMENT AND PLAN:    1.  Frontal fibrosing alopecia lichen planopilaris: Chronic and mildly active today. Small area of atrophy on the central frontal hairline. Good regrowth noted.     -A total of 1 mL of intralesional Kenalog 5 mg/mL injected into 10 sites along the frontal hairline and anterior vertex scalp  -Continue oral minoxidil at 2.5 mg daily.  No significant side effects noted.    -Continue ketoconazole 2% shampoo 3 times per week.   -Clobetasol solution nightly as needed for flares.    -Continue finasteride.    -Continue hydroxychloroquine.      The patient to follow up in 4 months' time and see her local dermatologist in 2 months' time.      Renee Aparicio MD   of Dermatology  [facility]    _______________________________________  _______________________________________      HISTORY OF PRESENT ILLNESS:  Ms. Coreas is a 47-year-old female returning to Dermatology Clinic for ongoing treatment of her lichen planopilaris.  Continues to follow with her local dermatologist for intralesional kenalog as well. Notes increased regrowth. Has some areas of atrophy.     PHYSICAL EXAMINATION:    GENERAL:  The patient is a healthy-appearing, 47-year-old female in no distress.  SKIN:  Exam included the face and the scalp.  Examination of the eyebrows shows decreased density.  Examination of the central and left frontal hairline with erythematous patches with follicularly based scaling.  Extensive new fibers in the part 2-4 cm in length.   Small 3 mm patch of decreased density on the frontal anterior hairline  with telangiectasia

## 2023-05-19 NOTE — NURSING NOTE
Drug Administration Record    Prior to injection, verified patient identity using patient's name and date of birth.  Due to injection administration, patient instructed to remain in clinic for 15 minutes  afterwards, and to report any adverse reaction to me immediately.    Drug Name: triamcinolone acetonide(kenalog)  Dose: 1mL of triamcinolone 5mg/mL, 5mg dose  Route administered: ID  NDC #: Kenalog-10 (4899-9043-70)  Amount of waste(mL):4.5mL  Reason for waste: Multi dose vial    LOT #: [id number]  SITE: See provider's notes  : [employer]  EXPIRATION DATE: 08/31/2024

## 2023-05-22 NOTE — TELEPHONE ENCOUNTER
Spoke to pharmacist, Cindi.  Let her know that patient should take 2.5 mg daily.    Merced Clemons, CMA

## 2023-05-22 NOTE — TELEPHONE ENCOUNTER
Dr. Aparicio- visit note says take 2.5 mg daily.  Prescription says to take 1.25 mg.  Just want to clarify before I call pharmacy back.      -Continue oral minoxidil at 2.5 mg daily.  No significant side effects noted.      Sig - Route: Take 1 tablet (2.5 mg) by mouth daily Take 1.25mg daily (1/2 tablet) - Oral

## 2023-06-01 ENCOUNTER — MYC MEDICAL ADVICE (OUTPATIENT)
Dept: DERMATOLOGY | Facility: CLINIC | Age: 48
End: 2023-06-01
Payer: COMMERCIAL

## 2023-09-19 NOTE — NURSING NOTE
Dermatology Rooming Note    Maria Ines Coreas's goals for this visit include:   Chief Complaint   Patient presents with     Hair Loss     Patient states that hair loss has worsened with it spreading to new areas on scalp.       Severiano Ward, EMT-B     Patient Specific Counseling (Will Not Stick From Patient To Patient): ***\\nRecommend topical steroid cream BID followed by moisturizer. Use a week at a time then week off. Repeat as needed.  Patient's father elects trial of OTC hydrocortisone first.  If this is not effective they will call for RX hydrocortisone. Detail Level: Zone

## 2023-10-06 ENCOUNTER — OFFICE VISIT (OUTPATIENT)
Dept: DERMATOLOGY | Facility: CLINIC | Age: 48
End: 2023-10-06
Payer: COMMERCIAL

## 2023-10-06 VITALS — SYSTOLIC BLOOD PRESSURE: 119 MMHG | DIASTOLIC BLOOD PRESSURE: 74 MMHG

## 2023-10-06 DIAGNOSIS — Z51.81 MEDICATION MONITORING ENCOUNTER: ICD-10-CM

## 2023-10-06 DIAGNOSIS — L66.10 LICHEN PLANOPILARIS: Primary | ICD-10-CM

## 2023-10-06 PROCEDURE — 11901 INJECT SKIN LESIONS >7: CPT | Performed by: DERMATOLOGY

## 2023-10-06 RX ORDER — HYDROXYCHLOROQUINE SULFATE 200 MG/1
200 TABLET, FILM COATED ORAL 2 TIMES DAILY
Qty: 180 TABLET | Refills: 3 | Status: SHIPPED | OUTPATIENT
Start: 2023-10-06 | End: 2024-06-14

## 2023-10-06 ASSESSMENT — PAIN SCALES - GENERAL: PAINLEVEL: NO PAIN (0)

## 2023-10-06 NOTE — NURSING NOTE
Drug Administration Record    Prior to injection, verified patient identity using patient's name and date of birth.  Due to injection administration, patient instructed to remain in clinic for 15 minutes  afterwards, and to report any adverse reaction to me immediately.    Drug Name: triamcinolone acetonide(kenalog)  Dose: 1mL of triamcinolone 5mg/mL, 5mg dose  Route administered: ID  NDC #: Kenalog-10 (3475-2343-94)  Amount of waste(mL):4.5mL  Reason for waste: Multi dose vial    LOT #: [id number]  SITE: see provider's note  : [employer]  EXPIRATION DATE: Nov 2025

## 2023-10-06 NOTE — LETTER
10/6/2023       RE: Maria Ines Coreas  [address] [place] [address]     Dear Colleague,    Thank you for referring your patient, Maria Ines Coreas, to the [facility] DERMATOLOGY [facility] [place] at [facility]. Please see a copy of my visit note below.    [facility] Dermatology Note  Encounter Date: Oct 6, 2023  Office Visit     Dermatology Problem List:  #. FFA/LPP. Biopsies: 2018.   - Current tx: finasteride, HCQ, oral minoxidil  - ILK:  2.5mg on 4/01/22, 5mg on 8/5/22, 10 mg on 10/06/23  - Adjunct tx: ketoconazole, fluocinonide, Latisse (brows)   - Future considerations: excimer, isotretinoin/acitretin   - nbUVB device not covered    ____________________________________________    Assessment & Plan:     #. FFA/LPP. Biopsies: 2018.   Disease more active and skin more inflamed than at last visit, so will try increasing plaquenil to 400 mg daily. Plan to continue to see her local dermatologist between visits here.  - IL-K injections performed today (see procedure note(s) below).  - Increase hydroxychloroquine to 200 mg twice a day   - Continue oral minoxidil at 2.5 mg daily.  No significant side effects noted.    - Continue ketoconazole 2% shampoo 3 times per week.   - Can try T-sal shampoo when not using ketoconazole  - Continue fluocinonide nightly  - Continue finasteride.        Procedures Performed:   - Intra-lesional triamcinolone procedure note. After verbal consent and review of risk of pain and skin thinning/atrophy, positioning and cleansing with isopropyl alcohol, 1 total mL of triamcinolone 10 mg/mL was injected into 10 lesion(s) on the scalp. The patient tolerated the procedure well and left the dermatology clinic in good condition.  - Performed by staff      Follow-up: 4 month(s) in-person, or earlier for new or changing lesions    Staff and Medical Student:  Patient seen and staffed with Dr. Aparicio.  Belia Baird,  MS3      I was present with the medical student who participated in the service and in the documentation of the note.  I have verified the history and personally performed the physical exam and medical decision making.  I agree with the assessment and plan of care as documented in the note. I performed the procedure(s).     Renee Aparicio MD   of Dermatology  [facility]    ____________________________________________    CC: Hair Loss (Maria Ines is here to follow up on her hairloss. She states that she thinks there has been more loss on the left side than normal. )    HPI:  Ms. Maria Ines Coreas is a(n) 47 year old female who presents today as a return patient for FFA/LPP. Last seen on 5/2023 where she received 1 mL of ILK 5 mg/mL.    Today she reports some increased hair loss near her part on the left side. She thinks it visibly looks thinner and has noticed more hair coming out in the shower. Still having scalp itching, no pain or burning.    Currently taking oral minoxidil (2.5 mg), finasteride (5 mg), and hydroxychloroquine (200 mg every day/BID every other day). Not noticing any side effects from her oral medications. Using fluocinonide nightly and ketoconazole shampoo maybe once a week. Using latisse to eyebrows. Is interested in more ILK today, last received about 2 months ago at her local dermatologist's office. Has noticed some atrophy of her scalp, not sure if related to injections or the disease process.    Patient is otherwise feeling well, without additional skin concerns.    Labs Reviewed:  N/A    Physical Exam:  Vitals: /74   SKIN: Focused examination of scalp, face, hands and nails was performed.  - perifollicular erythema and scale across frontal hairline, increased from previous visit  - diffuse thinning along bilateral temples, unchanged  - part width: 1.0  - prominent R temple vein, neutral  - depressed midline forehead vein  - diffuse thinning of  eyebrows, most prominent laterally  - eyelashes wnl  - nails are within normal limits. No pitting or onycholysis.   - No other lesions of concern on areas examined.     Medications:  Current Outpatient Medications   Medication    bimatoprost (LUMIGAN) 0.03 % ophthalmic solution    clobetasol (TEMOVATE) 0.05 % external solution    finasteride (PROSCAR) 5 MG tablet    fluocinonide (LIDEX) 0.05 % external solution    hydrocortisone 2.5 % cream    hydroxychloroquine (PLAQUENIL) 200 MG tablet    ketoconazole (NIZORAL) 2 % external shampoo    minoxidil (LONITEN) 2.5 MG tablet    prednisoLONE acetate (PRED FORTE) 1 % ophthalmic suspension    valACYclovir (VALTREX) 500 MG tablet    tacrolimus (PROTOPIC) 0.1 % external ointment     Current Facility-Administered Medications   Medication    triamcinolone acetonide (KENALOG-10) injection 10 mg    triamcinolone acetonide (KENALOG-10) injection 5 mg      Past Medical History:   There is no problem list on file for this patient.    No past medical history on file.

## 2023-10-06 NOTE — PROGRESS NOTES
[facility] Dermatology Note  Encounter Date: Oct 6, 2023  Office Visit     Dermatology Problem List:  #. FFA/LPP. Biopsies: 2018.   - Current tx: finasteride, HCQ, oral minoxidil  - ILK:  2.5mg on 4/01/22, 5mg on 8/5/22, 10 mg on 10/06/23  - Adjunct tx: ketoconazole, fluocinonide, Latisse (brows)   - Future considerations: excimer, isotretinoin/acitretin   - nbUVB device not covered    ____________________________________________    Assessment & Plan:     #. FFA/LPP. Biopsies: 2018.   Disease more active and skin more inflamed than at last visit, so will try increasing plaquenil to 400 mg daily. Plan to continue to see her local dermatologist between visits here.  - IL-K injections performed today (see procedure note(s) below).  - Increase hydroxychloroquine to 200 mg twice a day   - Continue oral minoxidil at 2.5 mg daily.  No significant side effects noted.    - Continue ketoconazole 2% shampoo 3 times per week.   - Can try T-sal shampoo when not using ketoconazole  - Continue fluocinonide nightly  - Continue finasteride.        Procedures Performed:   - Intra-lesional triamcinolone procedure note. After verbal consent and review of risk of pain and skin thinning/atrophy, positioning and cleansing with isopropyl alcohol, 1 total mL of triamcinolone 10 mg/mL was injected into 10 lesion(s) on the scalp. The patient tolerated the procedure well and left the dermatology clinic in good condition.  - Performed by staff      Follow-up: 4 month(s) in-person, or earlier for new or changing lesions    Staff and Medical Student:  Patient seen and staffed with Dr. Aparicio.  Belia Baird, MS3      I was present with the medical student who participated in the service and in the documentation of the note.  I have verified the history and personally performed the physical exam and medical decision making.  I agree with the assessment and plan of care as documented in the note. I performed the procedure(s).      Renee Aparicio MD   of Dermatology  [facility]    ____________________________________________    CC: Hair Loss (Maria Ines is here to follow up on her hairloss. She states that she thinks there has been more loss on the left side than normal. )    HPI:  Ms. Maria Ines Coreas is a(n) 47 year old female who presents today as a return patient for FFA/LPP. Last seen on 5/2023 where she received 1 mL of ILK 5 mg/mL.    Today she reports some increased hair loss near her part on the left side. She thinks it visibly looks thinner and has noticed more hair coming out in the shower. Still having scalp itching, no pain or burning.    Currently taking oral minoxidil (2.5 mg), finasteride (5 mg), and hydroxychloroquine (200 mg every day/BID every other day). Not noticing any side effects from her oral medications. Using fluocinonide nightly and ketoconazole shampoo maybe once a week. Using latisse to eyebrows. Is interested in more ILK today, last received about 2 months ago at her local dermatologist's office. Has noticed some atrophy of her scalp, not sure if related to injections or the disease process.    Patient is otherwise feeling well, without additional skin concerns.    Labs Reviewed:  N/A    Physical Exam:  Vitals: /74   SKIN: Focused examination of scalp, face, hands and nails was performed.  - perifollicular erythema and scale across frontal hairline, increased from previous visit  - diffuse thinning along bilateral temples, unchanged  - part width: 1.0  - prominent R temple vein, neutral  - depressed midline forehead vein  - diffuse thinning of eyebrows, most prominent laterally  - eyelashes wnl  - nails are within normal limits. No pitting or onycholysis.   - No other lesions of concern on areas examined.     Medications:  Current Outpatient Medications   Medication    bimatoprost (LUMIGAN) 0.03 % ophthalmic solution    clobetasol (TEMOVATE) 0.05 % external solution     finasteride (PROSCAR) 5 MG tablet    fluocinonide (LIDEX) 0.05 % external solution    hydrocortisone 2.5 % cream    hydroxychloroquine (PLAQUENIL) 200 MG tablet    ketoconazole (NIZORAL) 2 % external shampoo    minoxidil (LONITEN) 2.5 MG tablet    prednisoLONE acetate (PRED FORTE) 1 % ophthalmic suspension    valACYclovir (VALTREX) 500 MG tablet    tacrolimus (PROTOPIC) 0.1 % external ointment     Current Facility-Administered Medications   Medication    triamcinolone acetonide (KENALOG-10) injection 10 mg    triamcinolone acetonide (KENALOG-10) injection 5 mg      Past Medical History:   There is no problem list on file for this patient.    No past medical history on file.

## 2023-10-06 NOTE — PATIENT INSTRUCTIONS
Can try increasing plaquenil to 200 mg twice a day. Message if you need more medication.    Can try T-sal (over the counter salicylic acid) shampoo when not using ketoconazole.    [facility] (1st, 2nd, and 3rd Mondays of the month)  Located in: [facility]  Address: [address] [place] [address]  Phone: [phone]    Other providers at [facility]  Dr. Esquivel, Betito Iyer

## 2023-10-06 NOTE — NURSING NOTE
Dermatology Rooming Note    Maria Ines Coreas's goals for this visit include:   Chief Complaint   Patient presents with    Hair Loss     Maria Ines is here to follow up on her hairloss. She states that she thinks there has been more loss on the left side than normal.      Chelo Mace, visit facilitator

## 2023-10-22 ENCOUNTER — HEALTH MAINTENANCE LETTER (OUTPATIENT)
Age: 48
End: 2023-10-22

## 2024-02-04 ENCOUNTER — MYC REFILL (OUTPATIENT)
Dept: DERMATOLOGY | Facility: CLINIC | Age: 49
End: 2024-02-04
Payer: COMMERCIAL

## 2024-02-04 DIAGNOSIS — L66.10 LICHEN PLANOPILARIS: ICD-10-CM

## 2024-02-05 RX ORDER — BIMATOPROST 0.3 MG/ML
SOLUTION/ DROPS OPHTHALMIC
Qty: 5 ML | Refills: 6 | Status: SHIPPED | OUTPATIENT
Start: 2024-02-05

## 2024-02-05 NOTE — TELEPHONE ENCOUNTER
bimatoprost (LUMIGAN) 0.03 % ophthalmic solution       Last Written Prescription Date:  8-5-22  Last Fill Quantity: 5 ml,   # refills: 6  [facility]  Future Office visit:  2-20-24 Alvin Chin refill request to provider for review/approval because:  Med not on derm protocol

## 2024-02-20 ENCOUNTER — OFFICE VISIT (OUTPATIENT)
Dept: FAMILY MEDICINE | Facility: CLINIC | Age: 49
End: 2024-02-20
Payer: COMMERCIAL

## 2024-02-20 DIAGNOSIS — L66.10 LICHEN PLANOPILARIS: Primary | ICD-10-CM

## 2024-02-20 PROCEDURE — 11901 INJECT SKIN LESIONS >7: CPT | Performed by: DERMATOLOGY

## 2024-02-20 PROCEDURE — 99214 OFFICE O/P EST MOD 30 MIN: CPT | Mod: 25 | Performed by: DERMATOLOGY

## 2024-02-20 RX ORDER — DOXYCYCLINE 100 MG/1
100 CAPSULE ORAL 2 TIMES DAILY
Qty: 60 CAPSULE | Refills: 5 | Status: SHIPPED | OUTPATIENT
Start: 2024-02-20 | End: 2024-06-14

## 2024-02-20 NOTE — PROGRESS NOTES
[facility] Dermatology Note  Encounter Date: Feb 20, 2024  Office Visit     Dermatology Problem List:  1. FFA/LPP. Biopsies: 2018.   - Current tx: finasteride, HCQ, oral minoxidil, doxycycline, tacrolimus  - ILK:  2.5mg on 4/01/22, 5mg on 8/5/22, 10 mg on 10/06/23, 20 mg 02/20/24  - Adjunct tx: ketoconazole, fluocinonide, Latisse (brows)   - Future considerations: excimer, isotretinoin/acitretin   - nbUVB device not covered  - prior rx: low dose naltrexone    SHx: [place] in [place]  ____________________________________________    Assessment & Plan:    #. FFA/LPP. Biopsies: 2018. Photos fairly stable, however with ongoing symptoms of stinging and occasional pruritus, will upscale treatment. Discussed the risks and benefits of doxycycline, switch finasteride to dutasteride, tacrolimus solution, tofacitinib cream.  - Start doxycycline 100 mg BID. Counseled on side effects of GI upset and sun sensitivity..   - Start Tacrolimus 0.1% solution BID prn.  - IL-K injections performed today. See procedure note below.  - Continue hydroxychloroquine 200 mg twice a day   - Continue oral minoxidil at 2.5 mg daily.  No significant side effects noted.    - Continue finasteride.   - Continue ketoconazole 2% shampoo 3 times per week.   - Can try T-sal shampoo when not using ketoconazole   - Continue fluocinonide nightly   - last CBC, CMP wnl 8/5/22; need to ask about last eye exam    Procedures Performed:   - Intra-lesional triamcinolone procedure note. After verbal consent and review of risk of pain and skin thinning/atrophy, positioning and cleansing with isopropyl alcohol, 2 total mL of triamcinolone 10 mg/mL was injected into 20 lesion(s) on the scalp. The patient tolerated the procedure well and left the dermatology clinic in good condition.    Follow-up: 4 month(s) in-person as scheduled, or earlier for new or changing lesions    Staff and Scribe:     Scribe Disclosure:   DIANA THOMPSON, am  "serving as a scribe; to document services personally performed by Alana Esquivel MD -based on data collection and the provider's statements to me.    Provider Disclosure:   The documentation recorded by the scribe accurately reflects the services I personally performed and the decisions made by me.    Alana Esquivel MD    Department of Dermatology  [facility]: Phone: [phone], Fax: [phone]  2/23/2024     ____________________________________________    CC: Hair Loss    HPI:  Ms. Maria Ines Coreas is a(n) 48 year old female who presents today as a return patient for hair loss.    Previously seen by Dr. Aparicio. Still taking minoxidil, plaquenill 200 mg BID, and finasteride. Usually obtained injections every 2 months. Initially felt that ILK was helpful, but unable to tell now.     She feels HL has been progressing since her last visit in October 2023.    Has symptoms daily that she describes as \"stinging\". Pruritus does not occur daily.    Patient is otherwise feeling well, without additional skin concerns.    Labs Reviewed:  N/A    Physical Exam:  Vitals: There were no vitals taken for this visit.  SKIN: Focused examination of the scalp was performed.  - Erythema and PF scale most prominent on frontal vertex and frontal temporal hairline.   - Frontotemporal band of alopecia, with some maykel fibers of regrowth.  - No other lesions of concern on areas examined.     Medications:  Current Outpatient Medications   Medication    bimatoprost (LUMIGAN) 0.03 % ophthalmic solution    clobetasol (TEMOVATE) 0.05 % external solution    finasteride (PROSCAR) 5 MG tablet    fluocinonide (LIDEX) 0.05 % external solution    hydrocortisone 2.5 % cream    hydroxychloroquine (PLAQUENIL) 200 MG tablet    ketoconazole (NIZORAL) 2 % external shampoo    minoxidil (LONITEN) 2.5 MG tablet    prednisoLONE acetate (PRED FORTE) 1 % " ophthalmic suspension    tacrolimus (PROTOPIC) 0.1 % external ointment    valACYclovir (VALTREX) 500 MG tablet     Current Facility-Administered Medications   Medication    triamcinolone acetonide (KENALOG-10) injection 10 mg    triamcinolone acetonide (KENALOG-10) injection 5 mg      Past Medical History:   There is no problem list on file for this patient.    History reviewed. No pertinent past medical history.     CC Referred Self, MD  No address on file on close of this encounter.

## 2024-02-20 NOTE — PATIENT INSTRUCTIONS
Patient Education       Proper skin care from [facility]:    -Eliminate harsh soaps as they strip the natural oils from the skin, often resulting in dry itchy skin ( i.e. Dial, Zest, Urdu Spring)  -Use mild soaps such as Cetaphil or Dove Sensitive Skin in the shower. You do not need to use soap on arms, legs, and trunk every time you shower unless visibly soiled.   -Avoid hot or cold showers.  -After showering, lightly dry off and apply moisturizing within 2-3 minutes. This will help trap moisture in the skin.   -Aggressive use of a moisturizer at least 1-2 times a day to the entire body (including -Vanicream, Cetaphil, Aquaphor or Cerave) and moisturize hands after every washing.  -We recommend using moisturizers that come in a tub that needs to be scooped out, not a pump. This has more of an oil base. It will hold moisture in your skin much better than a water base moisturizer. The above recommended are non-pore clogging.      Wear a sunscreen with at least SPF 30 on your face, ears, neck and V of the chest daily. Wear sunscreen on other areas of the body if those areas are exposed to the sun throughout the day. Sunscreens can contain physical and/or chemical blockers. Physical blockers are less likely to clog pores, these include zinc oxide and titanium dioxide. Reapply every two hour and after swimming.     Sunscreen examples: https://www.ewg.org/sunscreen/    UV radiation  UVA radiation remains constant throughout the day and throughout the year. It is a longer wavelength than UVB and therefore penetrates deeper into the skin leading to immediate and delayed tanning, photoaging, and skin cancer. 70-80% of UVA and UVB radiation occurs between the hours of 10am-2pm.  UVB radiation  UVB radiation causes the most harmful effects and is more significant during the summer months. However, snow and ice can reflect UVB radiation leading to skin damage during the winter months as well. UVB radiation is  responsible for tanning, burning, inflammation, delayed erythema (pinkness), pigmentation (brown spots), and skin cancer.     I recommend self monthly full body exams and yearly full body exams with a dermatology provider. If you develop a new or changing lesion please follow up for examination. Most skin cancers are pink and scaly or pink and pearly. However, we do see blue/brown/black skin cancers.  Consider the ABCDEs of melanoma when giving yourself your monthly full body exam ( don't forget the groin, buttocks, feet, toes, etc). A-asymmetry, B-borders, C-color, D-diameter, E-elevation or evolving. If you see any of these changes please follow up in clinic. If you cannot see your back I recommend purchasing a hand held mirror to use with a larger wall mirror.       Checking for Skin Cancer  You can find cancer early by checking your skin each month. There are 3 kinds of skin cancer. They are melanoma, basal cell carcinoma, and squamous cell carcinoma. Doing monthly skin checks is the best way to find new marks or skin changes. Follow the instructions below for checking your skin.   The ABCDEs of checking moles for melanoma   Check your moles or growths for signs of melanoma using ABCDE:   Asymmetry: the sides of the mole or growth don t match  Border: the edges are ragged, notched, or blurred  Color: the color within the mole or growth varies  Diameter: the mole or growth is larger than 6 mm (size of a pencil eraser)  Evolving: the size, shape, or color of the mole or growth is changing (evolving is not shown in the images below)    Checking for other types of skin cancer  Basal cell carcinoma or squamous cell carcinoma have symptoms such as:     A spot or mole that looks different from all other marks on your skin  Changes in how an area feels, such as itching, tenderness, or pain  Changes in the skin's surface, such as oozing, bleeding, or scaliness  A sore that does not heal  New swelling or redness beyond  the border of a mole    Who s at risk?  Anyone can get skin cancer. But you are at greater risk if you have:   Fair skin, light-colored hair, or light-colored eyes  Many moles or abnormal moles on your skin  A history of sunburns from sunlight or tanning beds  A family history of skin cancer  A history of exposure to radiation or chemicals  A weakened immune system  If you have had skin cancer in the past, you are at risk for recurring skin cancer.   How to check your skin  Do your monthly skin checkups in front of a full-length mirror. Check all parts of your body, including your:   Head (ears, face, neck, and scalp)  Torso (front, back, and sides)  Arms (tops, undersides, upper, and lower armpits)  Hands (palms, backs, and fingers, including under the nails)  Buttocks and genitals  Legs (front, back, and sides)  Feet (tops, soles, toes, including under the nails, and between toes)  If you have a lot of moles, take digital photos of them each month. Make sure to take photos both up close and from a distance. These can help you see if any moles change over time.   Most skin changes are not cancer. But if you see any changes in your skin, call your doctor right away. Only he or she can diagnose a problem. If you have skin cancer, seeing your doctor can be the first step toward getting the treatment that could save your life.   [employer] last reviewed this educational content on 4/1/2019 2000-2020 [employer]. [address] [place] [address], [place] [address]. All rights reserved. This information is not intended as a substitute for professional medical care. Always follow your healthcare professional's instructions.       When should I call my doctor?  If you are worsening or not improving, please, contact us or seek urgent care as noted below.     Who should I call with questions (adults)?  [facility] (adult and pediatric): [phone]  [facility] (adult): [phone]  [facility] ([place], [place], [place] and [place]) [phone]  For urgent needs outside of business hours call the [facility] at [phone] and ask for the dermatology resident on call to be paged  If this is a medical emergency and you are unable to reach an ER, Call 911      If you need a prescription refill, please contact your pharmacy. Refills are approved or denied by our Physicians during normal business hours, Monday through Fridays  Per office policy, refills will not be granted if you have not been seen within the past year (or sooner depending on your child's condition)

## 2024-02-20 NOTE — LETTER
2/20/2024         RE: Maria Ines Coreas  [address] [place] [address]        Dear Colleague,    Thank you for referring your patient, Maria Ines Coreas, to the [facility]. Please see a copy of my visit note below.    [facility] Dermatology Note  Encounter Date: Feb 20, 2024  Office Visit     Dermatology Problem List:  1. FFA/LPP. Biopsies: 2018.   - Current tx: finasteride, HCQ, oral minoxidil, doxycycline, tacrolimus  - ILK:  2.5mg on 4/01/22, 5mg on 8/5/22, 10 mg on 10/06/23, 20 mg 02/20/24  - Adjunct tx: ketoconazole, fluocinonide, Latisse (brows)   - Future considerations: excimer, isotretinoin/acitretin   - nbUVB device not covered  - prior rx: low dose naltrexone    SHx: [place] in [place]  ____________________________________________    Assessment & Plan:    #. FFA/LPP. Biopsies: 2018. Photos fairly stable, however with ongoing symptoms of stinging and occasional pruritus, will upscale treatment. Discussed the risks and benefits of doxycycline, switch finasteride to dutasteride, tacrolimus solution, tofacitinib cream.  - Start doxycycline 100 mg BID. Counseled on side effects of GI upset and sun sensitivity..   - Start Tacrolimus 0.1% solution BID prn.  - IL-K injections performed today. See procedure note below.  - Continue hydroxychloroquine 200 mg twice a day   - Continue oral minoxidil at 2.5 mg daily.  No significant side effects noted.    - Continue finasteride.   - Continue ketoconazole 2% shampoo 3 times per week.   - Can try T-sal shampoo when not using ketoconazole   - Continue fluocinonide nightly   - last CBC, CMP wnl 8/5/22; need to ask about last eye exam    Procedures Performed:   - Intra-lesional triamcinolone procedure note. After verbal consent and review of risk of pain and skin thinning/atrophy, positioning and cleansing with isopropyl alcohol, 2 total mL of triamcinolone 10 mg/mL was injected into 20 lesion(s) on the  "scalp. The patient tolerated the procedure well and left the dermatology clinic in good condition.    Follow-up: 4 month(s) in-person as scheduled, or earlier for new or changing lesions    Staff and Scribe:     Scribe Disclosure:   I, DIANA PIERCE, am serving as a scribe; to document services personally performed by Alana Esquivel MD -based on data collection and the provider's statements to me.    Provider Disclosure:   The documentation recorded by the scribe accurately reflects the services I personally performed and the decisions made by me.    Alana Esquivel MD    Department of Dermatology  [facility]: Phone: [phone], Fax: [phone]  2/23/2024     ____________________________________________    CC: Hair Loss    HPI:  Ms. Maria Ines Coreas is a(n) 48 year old female who presents today as a return patient for hair loss.    Previously seen by Dr. Aparicio. Still taking minoxidil, plaquenill 200 mg BID, and finasteride. Usually obtained injections every 2 months. Initially felt that ILK was helpful, but unable to tell now.     She feels HL has been progressing since her last visit in October 2023.    Has symptoms daily that she describes as \"stinging\". Pruritus does not occur daily.    Patient is otherwise feeling well, without additional skin concerns.    Labs Reviewed:  N/A    Physical Exam:  Vitals: There were no vitals taken for this visit.  SKIN: Focused examination of the scalp was performed.  - Erythema and PF scale most prominent on frontal vertex and frontal temporal hairline.   - Frontotemporal band of alopecia, with some maykel fibers of regrowth.  - No other lesions of concern on areas examined.     Medications:  Current Outpatient Medications   Medication     bimatoprost (LUMIGAN) 0.03 % ophthalmic solution     clobetasol (TEMOVATE) 0.05 % external solution     finasteride (PROSCAR) 5 MG " tablet     fluocinonide (LIDEX) 0.05 % external solution     hydrocortisone 2.5 % cream     hydroxychloroquine (PLAQUENIL) 200 MG tablet     ketoconazole (NIZORAL) 2 % external shampoo     minoxidil (LONITEN) 2.5 MG tablet     prednisoLONE acetate (PRED FORTE) 1 % ophthalmic suspension     tacrolimus (PROTOPIC) 0.1 % external ointment     valACYclovir (VALTREX) 500 MG tablet     Current Facility-Administered Medications   Medication     triamcinolone acetonide (KENALOG-10) injection 10 mg     triamcinolone acetonide (KENALOG-10) injection 5 mg      Past Medical History:   There is no problem list on file for this patient.    History reviewed. No pertinent past medical history.     CC Referred Self, MD  No address on file on close of this encounter.      Again, thank you for allowing me to participate in the care of your patient.        Sincerely,        Alana Esquivel MD

## 2024-06-11 DIAGNOSIS — L66.10 LICHEN PLANOPILARIS: ICD-10-CM

## 2024-06-11 RX ORDER — MINOXIDIL 2.5 MG/1
2.5 TABLET ORAL DAILY
Qty: 90 TABLET | Refills: 3 | Status: SHIPPED | OUTPATIENT
Start: 2024-06-11

## 2024-06-11 NOTE — TELEPHONE ENCOUNTER
Routing refill request to provider for review/approval because:  Drug not on the [facility] refill protocol   Pt last seen by Dr. Alvin FRIEND RN  [facility]  [phone]

## 2024-06-14 ENCOUNTER — OFFICE VISIT (OUTPATIENT)
Dept: DERMATOLOGY | Facility: CLINIC | Age: 49
End: 2024-06-14
Payer: COMMERCIAL

## 2024-06-14 DIAGNOSIS — L30.9 DERMATITIS: ICD-10-CM

## 2024-06-14 DIAGNOSIS — L66.10 LICHEN PLANOPILARIS: Primary | ICD-10-CM

## 2024-06-14 PROCEDURE — 11901 INJECT SKIN LESIONS >7: CPT | Mod: GC | Performed by: DERMATOLOGY

## 2024-06-14 PROCEDURE — 99214 OFFICE O/P EST MOD 30 MIN: CPT | Mod: 25 | Performed by: DERMATOLOGY

## 2024-06-14 RX ORDER — FLUOCINONIDE TOPICAL SOLUTION USP, 0.05% 0.5 MG/ML
SOLUTION TOPICAL DAILY
Qty: 60 ML | Refills: 5 | Status: SHIPPED | OUTPATIENT
Start: 2024-06-14

## 2024-06-14 RX ORDER — HYDROXYCHLOROQUINE SULFATE 200 MG/1
200 TABLET, FILM COATED ORAL 2 TIMES DAILY
Qty: 180 TABLET | Refills: 3 | Status: SHIPPED | OUTPATIENT
Start: 2024-06-14

## 2024-06-14 RX ORDER — FINASTERIDE 5 MG/1
TABLET, FILM COATED ORAL
Qty: 90 TABLET | Refills: 3 | Status: SHIPPED | OUTPATIENT
Start: 2024-06-14

## 2024-06-14 RX ORDER — KETOCONAZOLE 20 MG/ML
SHAMPOO TOPICAL DAILY PRN
Qty: 120 ML | Refills: 11 | Status: SHIPPED | OUTPATIENT
Start: 2024-06-14

## 2024-06-14 NOTE — PROGRESS NOTES
[facility] Dermatology Note  Encounter Date: Jun 14, 2024  Office Visit     Dermatology Problem List:  1. FFA/LPP s/p bx 2018  - Current tx: finasteride 5 mg daily,  mg BID (has annual optho exams for herpes zoster ophthalmicus as well), oral minoxidil 2.5 mg daily  - ILK:  2.5mg on 4/01/22, 5mg on 8/5/22, 10 mg on 10/06/23, 20 mg (02/20/24, 6/14/24)  - Adjunct tx: ketoconazole, fluocinonide, Latisse (brows), tofacitinib solution  - Past tx: tacrolimus solution (burning/irritation), naltrexone, doxy (ineffective)  - Future considerations: excimer, isotretinoin/acitretin, mycophenolate  - nbUVB device not covered     SHx: [place] in [place]  ____________________________________________     Assessment & Plan:     # FFA/LPP s/p bx 2018  Overall with continued mild-moderate disease activity but fairly stable in comparison to prior photos. Also with ongoing symptoms of intermittent pruritus. Discussed the risks and benefits of further therapy options, including mycophenolate, tofacitinib solution, or switching finasteride to dutasteride. Also reviewed the possibility of allergic contact dermatitis given evidence of persistent inflammation but stable hair loss.  - ILK 10 injections performed today (will plan to go down to ILK 5 in the future). See procedure note below.  - Referral placed for patch testing to r/o ACD component  - Stop doxycycline given lack of significant response  - Start tofacitinib solution BID as needed (to [place])  - Continue hydroxychloroquine 200 mg BID  - Repeat safety labs for hydroxychloroquine (CBC and CMP); continue annual eye exams with optho (last 4/2024)  We forgot to have pt go down to lab so will contact her and see if she wants to get labs closer to home  - Continue oral minoxidil 2.5 mg daily  - Continue finasteride 5 mg daily  - Continue ketoconazole 2% shampoo 3 times per week, alternating with T-sal shampoo  - Continue fluocinonide solution nightly  "    Procedures Performed:   - Intra-lesional triamcinolone procedure note. After verbal consent and review of risk of pain and skin thinning/atrophy, positioning and cleansing with isopropyl alcohol, 2 total mL of triamcinolone 10 mg/mL was injected into 20 lesion(s) on the scalp. The patient tolerated the procedure well and left the dermatology clinic in good condition.    Follow-up: 10/18/2024 as previously scheduled    Staff and Resident:     Staffed with Dr. Alvin Anderson MD  Dermatology Resident (PGY-4)    Staff Physician Comments:   I saw and evaluated the patient with the resident and I agree with the assessment and plan.  I was present for the entire minor procedure and examination.    Alana Esquivel MD    Department of Dermatology  [facility]: Phone: [phone], Fax: [phone]  6/20/2024       ____________________________________________    CC: RECHECK (Alopecia follow up, things are \"not great\", more pain itching and redness )    HPI:  Ms. Maria Ines Coreas is a(n) 48 year old female who presents today as a return patient for LPP/FFA. Last seen in dermatology by me 2/20/24, at which time FFA/LPP was treated with ILK injections. She was also started on tacrolimus 0.1% soln, and then continued on hydroxychloroquine 200 mg, oral minoxidil 2.5 mg daily, finasteride, ketoconazole 2% shampoo, and fluocinonide.   - Today reports things are going okay; today is a pretty good day. She tried the tacrolimus solution but discontinued due to burning and irritation. Not sure if she notices much difference with the doxycycline  - Continues doxycycline 100 mg BID, hydroxychloroquine 200 mg BID (last eye exam in April), oral minoxidil 2.5 mg daily, finasteride 5 mg daily, Lidex solution, and keto shampoo & T-Sal shampoo (alternates; washes hair daily).   - Does continue to notices hair loss and inflammation. " Today is a relatively good day, but sometimes the pruritus can be pretty bad. Also has associated hair loss of eyebrows and eyelashes.   - ILK helps, but she does notice some atrophy. She says her local derm has been doing ILK 5 instead of 10 and that seems to have similar efficacy with less atrophy  - Patient is otherwise feeling well, without additional skin concerns.    Labs Reviewed:  N/A    Physical Exam:  Vitals: There were no vitals taken for this visit.  SKIN: Focused examination of scalp and face was performed.  - There is erythema and perifollicular scale most prominent on frontal vertex and frontal temporal scalp  - Frontotemporal band of alopecia, with some maykel fibers of regrowth.  - Patchy hair loss of eyebrows and some focal areas of loss on eyelids    Medications:  Current Outpatient Medications   Medication Sig Dispense Refill    bimatoprost (LUMIGAN) 0.03 % ophthalmic solution Apply to eyebrows nightly 5 mL 6    doxycycline monohydrate (MONODOX) 100 MG capsule Take 1 capsule (100 mg) by mouth 2 times daily 60 capsule 5    finasteride (PROSCAR) 5 MG tablet TAKE 1 TABLET BY MOUTH ONCE DAILY FOR ALOPECIA 90 tablet 3    fluocinonide (LIDEX) 0.05 % external solution APPLY TOPICALLY TO AFFECTED AREA ONCE DAILY      hydrocortisone 2.5 % cream Twice daily to eyebrow area as needed. 30 g 3    hydroxychloroquine (PLAQUENIL) 200 MG tablet Take 1 tablet (200 mg) by mouth 2 times daily 180 tablet 3    ketoconazole (NIZORAL) 2 % external shampoo Apply topically daily as needed for itching or irritation 120 mL 11    minoxidil (LONITEN) 2.5 MG tablet Take 1 tablet by mouth once daily 90 tablet 3    prednisoLONE acetate (PRED FORTE) 1 % ophthalmic suspension INSTILL 1 DROP INTO EACH EYE THREE TIMES A WEEK      tacrolimus (PROTOPIC) 0.1 % external ointment Apply 1-2 times daily to scalp. 60 g 6    valACYclovir (VALTREX) 500 MG tablet Take 500 mg by mouth daily      clobetasol (TEMOVATE) 0.05 % external solution  Apply to scalp nightly as needed for flares. (Patient not taking: Reported on 6/14/2024) 50 mL 3    COMPOUNDED NON-CONTROLLED SUBSTANCE (CMPD RX) - PHARMACY TO MIX COMPOUNDED MEDICATION Tacrolimus 0.1% solution - apply twice daily to scalp as needed (Patient not taking: Reported on 6/14/2024) 30 mL 3     Current Facility-Administered Medications   Medication Dose Route Frequency Provider Last Rate Last Admin    triamcinolone acetonide (KENALOG-10) injection 10 mg  10 mg Intra-Lesional Once Renee Aparicio MD        triamcinolone acetonide (KENALOG-10) injection 5 mg  5 mg Intra-Lesional Once Renee Aparicio MD          Past Medical History:   There is no problem list on file for this patient.    History reviewed. No pertinent past medical history.     CC Referred Self, MD  No address on file on close of this encounter.

## 2024-06-14 NOTE — NURSING NOTE
Drug Administration Record    Prior to injection, verified patient identity using patient's name and date of birth.  Due to injection administration, patient instructed to remain in clinic for 15 minutes  afterwards, and to report any adverse reaction to me immediately.    Drug Name: triamcinolone acetonide(kenalog)  Dose: 2 mL  Route administered: ID  NDC #: 1313-0467-14  Amount of waste(mL): 3 mL  Reason for waste: Single use vial    LOT #: [id number]  SITE: See Chart  : [facility]  EXPIRATION DATE: 02/2026

## 2024-06-14 NOTE — Clinical Note
We forgot to have pt get labs last week for plaquenil - could you message her and see if there's a place we could fax lab orders to? And then could she let us know when completed? Thank you!

## 2024-06-14 NOTE — NURSING NOTE
"Chief Complaint   Patient presents with    RECHECK     Alopecia follow up, things are \"not great\", more pain itching and redness      Dorothy COLBERT RN-BSN  Dermatology Surgery  [phone]      "

## 2024-06-14 NOTE — LETTER
6/14/2024       RE: Maria Ines Coreas  [address] [place] [address]     Dear Colleague,    Thank you for referring your patient, Maria Ines Coreas, to the [facility] DERMATOLOG[facility] [place] [facility]. Please see a copy of my visit note below.    [facility] Dermatology Note  Encounter Date: Jun 14, 2024  Office Visit     Dermatology Problem List:  1. FFA/LPP s/p bx 2018  - Current tx: finasteride 5 mg daily,  mg BID (has annual optho exams for herpes zoster ophthalmicus as well), oral minoxidil 2.5 mg daily  - ILK:  2.5mg on 4/01/22, 5mg on 8/5/22, 10 mg on 10/06/23, 20 mg (02/20/24, 6/14/24)  - Adjunct tx: ketoconazole, fluocinonide, Latisse (brows), tofacitinib solution  - Past tx: tacrolimus solution (burning/irritation), naltrexone, doxy (ineffective)  - Future considerations: excimer, isotretinoin/acitretin, mycophenolate  - nbUVB device not covered     SHx: [place] in [place]  ____________________________________________     Assessment & Plan:     # FFA/LPP s/p bx 2018  Overall with continued mild-moderate disease activity but fairly stable in comparison to prior photos. Also with ongoing symptoms of intermittent pruritus. Discussed the risks and benefits of further therapy options, including mycophenolate, tofacitinib solution, or switching finasteride to dutasteride. Also reviewed the possibility of allergic contact dermatitis given evidence of persistent inflammation but stable hair loss.  - ILK 10 injections performed today (will plan to go down to ILK 5 in the future). See procedure note below.  - Referral placed for patch testing to r/o ACD component  - Stop doxycycline given lack of significant response  - Start tofacitinib solution BID as needed (to [place])  - Continue hydroxychloroquine 200 mg BID  - Repeat safety labs for hydroxychloroquine (CBC and CMP); continue annual eye exams with  "optho (last 4/2024)  We forgot to have pt go down to lab so will contact her and see if she wants to get labs closer to home  - Continue oral minoxidil 2.5 mg daily  - Continue finasteride 5 mg daily  - Continue ketoconazole 2% shampoo 3 times per week, alternating with T-sal shampoo  - Continue fluocinonide solution nightly     Procedures Performed:   - Intra-lesional triamcinolone procedure note. After verbal consent and review of risk of pain and skin thinning/atrophy, positioning and cleansing with isopropyl alcohol, 2 total mL of triamcinolone 10 mg/mL was injected into 20 lesion(s) on the scalp. The patient tolerated the procedure well and left the dermatology clinic in good condition.    Follow-up: 10/18/2024 as previously scheduled    Staff and Resident:     Staffed with Dr. Alvin Anderson MD  Dermatology Resident (PGY-4)    Staff Physician Comments:   I saw and evaluated the patient with the resident and I agree with the assessment and plan.  I was present for the entire minor procedure and examination.    Alana Esquivel MD    Department of Dermatology  [facility]: Phone: [phone], Fax: [phone]  6/20/2024       ____________________________________________    CC: RECHECK (Alopecia follow up, things are \"not great\", more pain itching and redness )    HPI:  Ms. Maria Ines Coreas is a(n) 48 year old female who presents today as a return patient for LPP/FFA. Last seen in dermatology by me 2/20/24, at which time FFA/LPP was treated with ILK injections. She was also started on tacrolimus 0.1% soln, and then continued on hydroxychloroquine 200 mg, oral minoxidil 2.5 mg daily, finasteride, ketoconazole 2% shampoo, and fluocinonide.   - Today reports things are going okay; today is a pretty good day. She tried the tacrolimus solution but discontinued due to burning and irritation. Not sure if she " notices much difference with the doxycycline  - Continues doxycycline 100 mg BID, hydroxychloroquine 200 mg BID (last eye exam in April), oral minoxidil 2.5 mg daily, finasteride 5 mg daily, Lidex solution, and keto shampoo & T-Sal shampoo (alternates; washes hair daily).   - Does continue to notices hair loss and inflammation. Today is a relatively good day, but sometimes the pruritus can be pretty bad. Also has associated hair loss of eyebrows and eyelashes.   - ILK helps, but she does notice some atrophy. She says her local derm has been doing ILK 5 instead of 10 and that seems to have similar efficacy with less atrophy  - Patient is otherwise feeling well, without additional skin concerns.    Labs Reviewed:  N/A    Physical Exam:  Vitals: There were no vitals taken for this visit.  SKIN: Focused examination of scalp and face was performed.  - There is erythema and perifollicular scale most prominent on frontal vertex and frontal temporal scalp  - Frontotemporal band of alopecia, with some maykel fibers of regrowth.  - Patchy hair loss of eyebrows and some focal areas of loss on eyelids    Medications:  Current Outpatient Medications   Medication Sig Dispense Refill    bimatoprost (LUMIGAN) 0.03 % ophthalmic solution Apply to eyebrows nightly 5 mL 6    doxycycline monohydrate (MONODOX) 100 MG capsule Take 1 capsule (100 mg) by mouth 2 times daily 60 capsule 5    finasteride (PROSCAR) 5 MG tablet TAKE 1 TABLET BY MOUTH ONCE DAILY FOR ALOPECIA 90 tablet 3    fluocinonide (LIDEX) 0.05 % external solution APPLY TOPICALLY TO AFFECTED AREA ONCE DAILY      hydrocortisone 2.5 % cream Twice daily to eyebrow area as needed. 30 g 3    hydroxychloroquine (PLAQUENIL) 200 MG tablet Take 1 tablet (200 mg) by mouth 2 times daily 180 tablet 3    ketoconazole (NIZORAL) 2 % external shampoo Apply topically daily as needed for itching or irritation 120 mL 11    minoxidil (LONITEN) 2.5 MG tablet Take 1 tablet by mouth once daily 90  tablet 3    prednisoLONE acetate (PRED FORTE) 1 % ophthalmic suspension INSTILL 1 DROP INTO EACH EYE THREE TIMES A WEEK      tacrolimus (PROTOPIC) 0.1 % external ointment Apply 1-2 times daily to scalp. 60 g 6    valACYclovir (VALTREX) 500 MG tablet Take 500 mg by mouth daily      clobetasol (TEMOVATE) 0.05 % external solution Apply to scalp nightly as needed for flares. (Patient not taking: Reported on 6/14/2024) 50 mL 3    COMPOUNDED NON-CONTROLLED SUBSTANCE (CMPD RX) - PHARMACY TO MIX COMPOUNDED MEDICATION Tacrolimus 0.1% solution - apply twice daily to scalp as needed (Patient not taking: Reported on 6/14/2024) 30 mL 3     Current Facility-Administered Medications   Medication Dose Route Frequency Provider Last Rate Last Admin    triamcinolone acetonide (KENALOG-10) injection 10 mg  10 mg Intra-Lesional Once Renee Aparicio MD        triamcinolone acetonide (KENALOG-10) injection 5 mg  5 mg Intra-Lesional Once Renee Aparicio MD          Past Medical History:   There is no problem list on file for this patient.    History reviewed. No pertinent past medical history.     CC Referred Self,

## 2024-06-27 ENCOUNTER — TELEPHONE (OUTPATIENT)
Dept: ALLERGY | Facility: CLINIC | Age: 49
End: 2024-06-27
Payer: COMMERCIAL

## 2024-06-27 NOTE — TELEPHONE ENCOUNTER
Per staff messages between referring provider and Dr. Mclaughlin, attempted to reach pt to rescheduled for a sooner consult that what is currently scheduled in February 2025.    VMM left for pt to call back to speak with allergy team. As of now, clinic can fit in for appt on same day as pt is returning for an appt with the referring provider (Dr. Esquivel, dermatology), to make things more convenient for pt.

## 2024-07-11 NOTE — TELEPHONE ENCOUNTER
Spoke to pt, rescheduled allergy consult for 10/18/24 at 0830 (same day as derm appt to make more convenient for pt)

## 2024-09-18 NOTE — TELEPHONE ENCOUNTER
FUTURE VISIT INFORMATION      FUTURE VISIT INFORMATION:  Date: 10/18/24  Time: 8:30 am  [place] [facility] [place]ERR[place] INFORMATION:  Referring provider:  Alana Esquivel MD   Referring providers clinic:  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 R/o ACD component     RECORDS REQUESTED FROM:       Clinic name Comments Records Status Imaging Status   [facility] [place] 6/14/24 - OV, Alvin  2/20/24 - OV, Alvin  10/6/24 - OV, Alessandra  5/19/23 - OV, Alessandra  1/20/23 - OV, Alessandra Epic

## 2024-10-11 ENCOUNTER — TELEPHONE (OUTPATIENT)
Dept: DERMATOLOGY | Facility: CLINIC | Age: 49
End: 2024-10-11
Payer: COMMERCIAL

## 2024-10-16 NOTE — PROGRESS NOTES
"[facility] Dermatology Note  Encounter Date: Oct 18, 2024  Office Visit     Dermatology Problem List:  1. FFA/LPP s/p bx 2018  - Current tx: doxycycline 100 mg BID, finasteride 5 mg daily,  mg BID (has annual optho exams for herpes zoster ophthalmicus as well), oral minoxidil 2.5 mg daily  - ILK:  2.5mg on 4/01/22, 5mg on 8/5/22, 10 mg on 10/06/23, 20 mg (02/20/24, 6/14/24), 10mg (10/18/2024)  - Adjunct tx: ketoconazole, fluocinonide, Latisse (brows), tofacitinib solution  - Past tx: tacrolimus solution (burning/irritation), naltrexone, topical tofacitinib (caused burning/itch)  - Future considerations: excimer, isotretinoin/acitretin, mycophenolate  - nbUVB device not covered     SHx: [place] in [place]  ____________________________________________     Assessment & Plan:     # 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 Patient report of more shedding since her last visit but does note more work related stress so could be some component of TE. She is mostly active on mid frontal scalp. We did also stop doxycycline last visit.  Discussed risks and benefits of further therapy options, including mycophenolate or Methotrexate. She previously tried MMF with her dermatologist in [place] but stopped due to prior notes indicating \"shortness of breath\" although does not recall this and would be open to re-trying MMF. We also discussed adding back in doxycycline because she has noticed more shedding since this was discontinued. For now will restart doxy and if she continues to progress can consider MMF or MTX at her next visit. She also met with Dr. Mclaughlin today and plans to get patch testing next Spring.   - ILK 10 injections performed today. See procedure note below.  - Restart doxycycline 100 mg BID  - Safety labs today in anticipation of starting immunosuppressive med at next visit (CBC, " CMP, hepatitis, HIV, TB)  - Continue hydroxychloroquine 200 mg BID  - Repeat safety labs for hydroxychloroquine today (CBC and CMP); continue annual eye exams with optho (last 4/2024)  - Continue oral minoxidil 2.5 mg daily  - Continue finasteride 5 mg daily  - Continue ketoconazole 2% shampoo 3 times per week, alternating with T-sal shampoo  - Continue fluocinonide solution nightly     Procedures Performed:   - Intra-lesional triamcinolone procedure note. After verbal consent and review of risk of pain and skin thinning/atrophy, positioning and cleansing with isopropyl alcohol, 2 total mL of triamcinolone 10 mg/mL was injected into 20 lesion(s) on the scalp. The patient tolerated the procedure well and left the dermatology clinic in good condition.    Follow-up: February as previously scheduled    Staff and Scribe:     Scribe Disclosure:   IJolene, am serving as a scribe; to document services personally performed by Alana Esquivel MD -based on data collection and the provider's statements to me.       Staff and Resident:    ITito MD, discussed and evaluated the patient with Dr. Esquivel.    Staff Physician Comments:   I saw and evaluated the patient with the resident and I agree with the assessment and plan.  I was present for the entire minor procedure and examination.    Alana Esquivel MD    Department of Dermatology  [facility]: Phone: [phone], Fax: [phone]  10/24/2024         ____________________________________________    CC: Derm Problem and Hair Loss (Has been a lot worse than last appt, much more shedding noticed. Today there has been less shedding noticed which is out of the ordinary for pt.)    HPI:  Ms. Maria Ines Coreas is a(n) 48 year old female who presents today as a return patient for LPP/FFA. She tried tofacitinib solution for a month but felt this caused more  redness and itching so she stopped. Only apply the fluocinonide solution at night. Reports a lot more shedding since her last visit. Has a lot of itching on center of scalp but feels this is about the same as usual. Sometimes gets burning with application of the cluocinonide solution. Using keto shampoo three times per week alternating with T-sal. She did not notice much improvement with hair shedding after injections at her last visit. She also goes to a local dermatologist in [place] that last did injections in August and didn;t think those helped wither. No change in eyebrow or lash density. Patient is otherwise feeling well, without additional skin concerns.    Labs Reviewed:  N/A    Physical exam:  Vitals: /72 (BP Location: Right arm)   GEN: This is a well developed, well-nourished female in no acute distress, in a pleasant mood.    - There is erythema and perifollicular scale most prominent on frontal midline scalp and frontal parietal scalp  - Frontotemporal band of alopecia, with some maykel fibers of regrowth.  - Patchy hair loss of eyebrows and some focal areas of loss on eyelids  - No other lesions of concern on areas examined.                                     Medications:  Current Outpatient Medications   Medication Sig Dispense Refill    bimatoprost (LUMIGAN) 0.03 % ophthalmic solution Apply to eyebrows nightly 5 mL 6    COMPOUNDED NON-CONTROLLED SUBSTANCE (CMPD RX) - PHARMACY TO MIX COMPOUNDED MEDICATION Apply to scalp twice daily as needed 10 mL 3    doxycycline monohydrate (MONODOX) 100 MG capsule Take 1 capsule (100 mg) by mouth 2 times daily. 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 30 g 3    hydroxychloroquine (PLAQUENIL) 200 MG tablet Take 1 tablet  (200 mg) by mouth 2 times daily 180 tablet 3    ketoconazole (NIZORAL) 2 % external shampoo Apply topically daily as needed for itching or irritation 120 mL 11    minoxidil (LONITEN) 2.5 MG tablet Take 1 tablet by mouth once daily 90 tablet 3    prednisoLONE acetate (PRED FORTE) 1 % ophthalmic suspension INSTILL 1 DROP INTO EACH EYE THREE TIMES A WEEK      tacrolimus (PROTOPIC) 0.1 % external ointment Apply 1-2 times daily to scalp. 60 g 6    valACYclovir (VALTREX) 500 MG tablet Take 500 mg by mouth daily       Current Facility-Administered Medications   Medication Dose Route Frequency Provider Last Rate Last Admin    triamcinolone acetonide (KENALOG-10) injection 10 mg  10 mg Intra-Lesional Once Renee Aparicio MD        triamcinolone acetonide (KENALOG-10) injection 5 mg  5 mg Intra-Lesional Once Renee Aparicio MD          Past Medical History:   There is no problem list on file for this patient.    History reviewed. No pertinent past medical history.     CC Referred Self, MD  No address on file on close of this encounter.

## 2024-10-18 ENCOUNTER — PRE VISIT (OUTPATIENT)
Dept: ALLERGY | Facility: CLINIC | Age: 49
End: 2024-10-18

## 2024-10-18 ENCOUNTER — OFFICE VISIT (OUTPATIENT)
Dept: ALLERGY | Facility: CLINIC | Age: 49
End: 2024-10-18
Attending: DERMATOLOGY
Payer: COMMERCIAL

## 2024-10-18 ENCOUNTER — OFFICE VISIT (OUTPATIENT)
Dept: DERMATOLOGY | Facility: CLINIC | Age: 49
End: 2024-10-18
Payer: COMMERCIAL

## 2024-10-18 ENCOUNTER — LAB (OUTPATIENT)
Dept: LAB | Facility: CLINIC | Age: 49
End: 2024-10-18
Payer: COMMERCIAL

## 2024-10-18 VITALS — DIASTOLIC BLOOD PRESSURE: 72 MMHG | SYSTOLIC BLOOD PRESSURE: 109 MMHG

## 2024-10-18 DIAGNOSIS — L23.5 ALLERGIC DERMATITIS DUE TO OTHER CHEMICAL PRODUCT: ICD-10-CM

## 2024-10-18 DIAGNOSIS — L66.10 LICHEN PLANOPILARIS: ICD-10-CM

## 2024-10-18 DIAGNOSIS — Z88.9 ATOPY: ICD-10-CM

## 2024-10-18 DIAGNOSIS — L66.10 LICHEN PLANOPILARIS: Primary | ICD-10-CM

## 2024-10-18 DIAGNOSIS — L20.89 OTHER ATOPIC DERMATITIS: ICD-10-CM

## 2024-10-18 DIAGNOSIS — L66.12 FRONTAL FIBROSING ALOPECIA: Primary | ICD-10-CM

## 2024-10-18 LAB
ALBUMIN SERPL BCG-MCNC: 4.7 G/DL (ref 3.5–5.2)
ALP SERPL-CCNC: 75 U/L (ref 40–150)
ALT SERPL W P-5'-P-CCNC: 17 U/L (ref 0–50)
ANION GAP SERPL CALCULATED.3IONS-SCNC: 9 MMOL/L (ref 7–15)
AST SERPL W P-5'-P-CCNC: 27 U/L (ref 0–45)
BASOPHILS # BLD AUTO: 0 10E3/UL (ref 0–0.2)
BASOPHILS NFR BLD AUTO: 1 %
BILIRUB SERPL-MCNC: 0.5 MG/DL
BUN SERPL-MCNC: 10.2 MG/DL (ref 6–20)
CALCIUM SERPL-MCNC: 9.6 MG/DL (ref 8.8–10.4)
CHLORIDE SERPL-SCNC: 104 MMOL/L (ref 98–107)
CREAT SERPL-MCNC: 0.86 MG/DL (ref 0.51–0.95)
EGFRCR SERPLBLD CKD-EPI 2021: 83 ML/MIN/1.73M2
EOSINOPHIL # BLD AUTO: 0.1 10E3/UL (ref 0–0.7)
EOSINOPHIL NFR BLD AUTO: 1 %
ERYTHROCYTE [DISTWIDTH] IN BLOOD BY AUTOMATED COUNT: 12.7 % (ref 10–15)
GLUCOSE SERPL-MCNC: 94 MG/DL (ref 70–99)
HBV CORE AB SERPL QL IA: NONREACTIVE
HBV SURFACE AB SERPL IA-ACNC: <3.5 M[IU]/ML
HBV SURFACE AB SERPL IA-ACNC: NONREACTIVE M[IU]/ML
HBV SURFACE AG SERPL QL IA: NONREACTIVE
HCO3 SERPL-SCNC: 27 MMOL/L (ref 22–29)
HCT VFR BLD AUTO: 40.6 % (ref 35–47)
HCV AB SERPL QL IA: NONREACTIVE
HGB BLD-MCNC: 13.3 G/DL (ref 11.7–15.7)
HIV 1+2 AB+HIV1 P24 AG SERPL QL IA: NONREACTIVE
IMM GRANULOCYTES # BLD: 0 10E3/UL
IMM GRANULOCYTES NFR BLD: 0 %
LYMPHOCYTES # BLD AUTO: 1.3 10E3/UL (ref 0.8–5.3)
LYMPHOCYTES NFR BLD AUTO: 23 %
MCH RBC QN AUTO: 28.4 PG (ref 26.5–33)
MCHC RBC AUTO-ENTMCNC: 32.8 G/DL (ref 31.5–36.5)
MCV RBC AUTO: 87 FL (ref 78–100)
MONOCYTES # BLD AUTO: 0.3 10E3/UL (ref 0–1.3)
MONOCYTES NFR BLD AUTO: 6 %
NEUTROPHILS # BLD AUTO: 4 10E3/UL (ref 1.6–8.3)
NEUTROPHILS NFR BLD AUTO: 70 %
NRBC # BLD AUTO: 0 10E3/UL
NRBC BLD AUTO-RTO: 0 /100
PLATELET # BLD AUTO: 311 10E3/UL (ref 150–450)
POTASSIUM SERPL-SCNC: 3.9 MMOL/L (ref 3.4–5.3)
PROT SERPL-MCNC: 7.2 G/DL (ref 6.4–8.3)
RBC # BLD AUTO: 4.68 10E6/UL (ref 3.8–5.2)
SODIUM SERPL-SCNC: 140 MMOL/L (ref 135–145)
WBC # BLD AUTO: 5.6 10E3/UL (ref 4–11)

## 2024-10-18 PROCEDURE — 86704 HEP B CORE ANTIBODY TOTAL: CPT | Performed by: DERMATOLOGY

## 2024-10-18 PROCEDURE — 87389 HIV-1 AG W/HIV-1&-2 AB AG IA: CPT | Performed by: DERMATOLOGY

## 2024-10-18 PROCEDURE — 85025 COMPLETE CBC W/AUTO DIFF WBC: CPT | Performed by: PATHOLOGY

## 2024-10-18 PROCEDURE — 99214 OFFICE O/P EST MOD 30 MIN: CPT | Mod: 25 | Performed by: DERMATOLOGY

## 2024-10-18 PROCEDURE — 36415 COLL VENOUS BLD VENIPUNCTURE: CPT | Performed by: PATHOLOGY

## 2024-10-18 PROCEDURE — 99000 SPECIMEN HANDLING OFFICE-LAB: CPT | Performed by: PATHOLOGY

## 2024-10-18 PROCEDURE — 11901 INJECT SKIN LESIONS >7: CPT | Mod: GC | Performed by: DERMATOLOGY

## 2024-10-18 PROCEDURE — 80053 COMPREHEN METABOLIC PANEL: CPT | Performed by: PATHOLOGY

## 2024-10-18 PROCEDURE — 95004 PERQ TESTS W/ALRGNC XTRCS: CPT | Performed by: DERMATOLOGY

## 2024-10-18 PROCEDURE — 87340 HEPATITIS B SURFACE AG IA: CPT | Performed by: DERMATOLOGY

## 2024-10-18 PROCEDURE — 86803 HEPATITIS C AB TEST: CPT | Performed by: DERMATOLOGY

## 2024-10-18 PROCEDURE — 86481 TB AG RESPONSE T-CELL SUSP: CPT | Performed by: DERMATOLOGY

## 2024-10-18 PROCEDURE — 86706 HEP B SURFACE ANTIBODY: CPT | Performed by: DERMATOLOGY

## 2024-10-18 RX ORDER — DOXYCYCLINE 100 MG/1
100 CAPSULE ORAL 2 TIMES DAILY
Qty: 60 CAPSULE | Refills: 5 | Status: SHIPPED | OUTPATIENT
Start: 2024-10-18

## 2024-10-18 ASSESSMENT — PAIN SCALES - GENERAL: PAINLEVEL: NO PAIN (0)

## 2024-10-18 NOTE — NURSING NOTE
Dermatology Rooming Note    Maria Ines Coreas's goals for this visit include:   Chief Complaint   Patient presents with    Derm Problem    Hair Loss     Has been a lot worse than last appt, much more shedding noticed. Today there has been less shedding noticed which is out of the ordinary for pt.     Eri Warren, EMT

## 2024-10-18 NOTE — NURSING NOTE
Chief Complaint   Patient presents with    Allergy Consult     See MD note.     Robbi Bowling RN

## 2024-10-18 NOTE — NURSING NOTE
Drug Administration Record    Prior to injection, verified patient identity using patient's name and date of birth.  Due to injection administration, patient instructed to remain in clinic for 15 minutes  afterwards, and to report any adverse reaction to me immediately.    Drug Name: triamcinolone acetonide(kenalog)  Dose: 2mL of triamcinolone 10mg/mL, 20mg dose  Route administered: ID  NDC #: [id number]  Amount of waste(mL):3ml  Reason for waste: Multi dose vial    LOT #: [id number]  SITE: see provider note  : [employer]  EXPIRATION DATE: 3/2026

## 2024-10-18 NOTE — PROGRESS NOTES
[facility] Dermato-allergology Note  Office visit  Encounter Date: Oct 18, 2024  ____________________________________________    CC: Allergy Consult (See MD note.)      HPI:  (Oct 18, 2024)  Ms. Maria Ines Coreas is a(n) 48 year old female who presents today as new patient for allergy consultation  - Referred by Dr. Esquivel on 6/14/24 for LPP/FFA with stable hair loss but persistent erythema/scale and pruritus so wanting to rule out allergic contact dermatitis as a component  - Scheduled to follow up with Dr. Esquivel at 09:30  - Has had scalp issues for approaching 10 years  - Frontal scalp is itchy and sometimes scaly and dry  - Has never noticed involvement on any other part of back  - No other skin involvement  - Otherwise feeling well in usual state of health    Physical Exam:  General: In no acute distress, well-developed, well-nourished  Eyes: no conjunctivitis  ENT: no signs of rhinitis   Pulmonary: no wheezing or coughing  Skin: Focused examination of the skin on test sites was performed = see test results below  Focused examination of the scalp was performed.  - On the front scalp, no signs of inflammation or desquamation.   - On the occipital scalp, no signs of erythema or desquamation.    Past Medical History:   There is no problem list on file for this patient.    No past medical history on file.    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 30 g 3    hydroxychloroquine (PLAQUENIL) 200 MG tablet Take 1 tablet (200 mg) by mouth 2 times daily 180 tablet 3    ketoconazole (NIZORAL) 2 % external shampoo Apply topically daily as needed for itching or irritation 120 mL 11    minoxidil (LONITEN) 2.5 MG tablet Take 1 tablet by mouth once daily 90 tablet 3    prednisoLONE acetate (PRED FORTE) 1 % ophthalmic suspension INSTILL 1 DROP INTO EACH EYE THREE TIMES A WEEK      tacrolimus (PROTOPIC) 0.1 % external ointment Apply 1-2 times daily to scalp. 60 g 6    valACYclovir (VALTREX) 500 MG tablet Take 500 mg by mouth daily       Current Facility-Administered Medications   Medication Dose Route Frequency Provider Last Rate Last Admin    triamcinolone acetonide (KENALOG-10) injection 10 mg  10 mg Intra-Lesional Once Renee Aparicio MD        triamcinolone acetonide (KENALOG-10) injection 5 mg  5 mg Intra-Lesional Once Renee Aparicio MD         Previous Labs, Allergy Tests, Dermatopathology, Imaging:  [Upon review of Epic chart, no prior allergy records as of 10/18/24.]     6/14/24 Dr. Alana Esquivel (derm) - most recent derm visit in [facility]  DERM PROBLEM LIST:  1. 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 She tried the tacrolimus solution but discontinued due to burning and irritation. Not sure if she notices much difference with the doxycycline  - Continues  doxycycline 100 mg BID, hydroxychloroquine 200 mg BID (last eye exam in April), oral minoxidil 2.5 mg daily, finasteride 5 mg daily, Lidex solution, and keto shampoo & T-Sal shampoo (alternates; washes hair daily).   - Does continue to notices hair loss and inflammation. Today is a relatively good day, but sometimes the pruritus can be pretty bad. Also has associated hair loss of eyebrows and eyelashes.   - ILK helps, but she does notice some atrophy. She says her local derm has been doing ILK 5 instead of 10 and that seems to have similar efficacy with less atrophy    FROM A&P:  # FFA/LPP s/p bx 2018  Overall with continued mild-moderate disease activity but fairly stable in comparison to prior photos. Also with ongoing symptoms of intermittent pruritus. Discussed the risks and benefits of further therapy options, including mycophenolate, tofacitinib solution, or switching finasteride to dutasteride. Also reviewed the possibility of allergic contact dermatitis given evidence of persistent inflammation but stable hair loss.  - ILK 10 injections performed today (will plan to go down to ILK 5 in the future). See procedure note below.  - Referral placed for patch testing to r/o ACD component  - Stop doxycycline given lack of significant response  - Start tofacitinib solution BID as needed (to [place])  - Continue hydroxychloroquine 200 mg BID  - Repeat safety labs for hydroxychloroquine (CBC and CMP); continue annual eye exams with optho (last 4/2024)  We forgot to have pt go down to lab so will contact her and see if she wants to get labs closer to home  - Continue oral minoxidil 2.5 mg daily  - Continue finasteride 5 mg daily  - Continue ketoconazole 2% shampoo 3 times per week, alternating with T-sal shampoo  - Continue fluocinonide solution nightly        Referred By: Alana Esquivel MD (derm)  [address] [place] [id number]     Allergy Tests:  Past Allergy Test    Family History:  No family history on  file.  Dad and now her son (18 y/o) with very dry, cracking hands.  Mom has pollen allergies/hay fever.  Daughter has runny/drippy nose and takes allergy medication regularly.    Social History:  The patient works as an , currently in more of a supervisory position. Patient has the following hobbies or non-occupational exposure: run.  Does not work with paint/art work.    Order for Future Allergy Testing:    [x] Outpatient  [] Inpatient: [facility]..../ Bed ...    Skin Atopy (atopic dermatitis)? [] Yes     [x] No  Comments:   Childhood eczema?   [] Yes     [x] No  Comments:   Hand eczema?   [] Yes     [x] No  If yes, leading hand? [] Right     [] Left     [] Ambidextrous  Comments:     Contact allergies?   [x] Yes     [] No  Comments: adds hi-lites to her hair  Including adhesives/bandages? [] Yes     [x] No  Comments:   Including metals?   [x] Yes     [] No  Comments:   - wears a few rings currently  - used to have more ear piercings, but ears would get red and inflamed, so stopped wearing them    Drug allergies?   [] Yes     [x] No  Comments:     Angioedema?   [] Yes     [x] No  Comments:     Urticaria?   [x] Yes     [] No  Comments: has at some point in her life, rarely and a handful of time, did not last long and she is unaware of trigger    Food allergies?   [] Yes     [x] No  Comments:     Pet allergies?   [] Yes     [x] No  Comments: has 1 dog and 1 cat; has had dog and/or cat for >20 years, and has never noticed reactions around them    Environmental allergy symptoms?  [] Conjunctivitis  [] Otitis  [] Pharyngitis  [] Polyposis  [] Postnasal Drip  [] Rhinitis  [] Sinusitis  [x] None  Comments: many years ago, only had to take an allergy pill for 1 year when she had watery feeling in ears    HENT Operations?  [] Adenoids [] Septum [] Sinus  [] Tonsils        [x] Other: wisdom teeth extraction  [] None  Comments:     Pulmonary symptoms (from birth to present)?  [] Asthma bronchiale  Inhaler(s)?:   []  Coughing  [] Other  [x] None  Comments:     Environmental and pulmonary symptoms aggravated by?  Season: [x] None     [] I     [] II     [] III     [] IV     [] V     [] VI          [] VII     [] VIII     [] IX     [] X     [] XI     [] XII     [] Perennial  Time of Day: [x] None     [] Morning     [] Noon     [] Evening     [] Night     [] Whole Day  Location/Changes: [x] None     [] Inside     [] Outside     [] Mountain     [] Sea     [] Other:   Triggers (specific): [x] None     [] Animals     [] Dust     [] Mold     [] Plants     [] Other:   Triggers (other): [x] None     [] Psyche     [] Sport     [] Work     [] Other:   Irritant: [x] None     [] Cold     [] Heat     [] Odors     [] Physical Efforts     [] Smoke     [] Other:     Order for PATCH TESTS  Reason for tests (suspected allergy): ruling out ACD component of chronic, recurrent, therapy-resistant LPP/FFA  Known previous allergies: none  Standardized panels  [x] Standard panel (40 tests)  [x] Preservatives & Antimicrobials (31 tests)  [x] Emulsifiers & Additives (25 tests)   [x] Perfumes/Flavours & Plants (25 tests)  [x] Hairdresser panel (12 tests)  [] Rubber Chemicals (22 tests)  [] Plastics (26 tests)  [] Colorants/Dyes/Food additives (20 tests)  [] Metals (implants/dental) (24 tests)  [] Local anaesthetics/NSAIDs (13 tests)  [] Antibiotics & Antimycotics (14 tests)   [x] Corticosteroids (15 tests)   [] Photopatch test (62 tests)   [] Others:     [] Patient's Own Products:   DO NOT test if chemical or biological identity is unknown!   always ask from patient the product information and safety sheets (MSDS)       Order for PRICK TESTS    Reason for tests (suspected allergy): atopy? No clear signs in history other than positive FHx  Known previous allergies: none    Standardized prick panels  [x] Atopic panel (20 tests)  [] Pediatric Panel (12 tests)  [] Milk, Meat, Eggs, Grains (20 tests)   [] Dust, Epithelia, Feathers (10 tests)  [] Fish, Seafood,  Shellfish (17 tests)  [] Nuts, Beans (14 tests)  [] Spice, Vegetable, Fruit (17 tests)  [] Pollen Panel = Tree, Grass, Weed (24 tests)  [] Others:     [] Patient's Own Products:   DO NOT test if chemical or biological identity is unknown!   always ask from patient the product information and safety sheets (MSDS)     Standardized intradermal tests  [] Alternaria alternata  [] Aspergillus fumigatus  [] Cladosporium herbarum   [] Penicillium notatum  [] Dermatophagoides farinae  [] Dermatophagoides pteronyssinus  [] Dog Epithelium  [] Cat Epithelium  [] Others:     [] Bee venom   [] Wasp venom  !!Specific protocol with dilutions!!       Order for Drug allergy tests (prick & intradermal & patch tests)    [] Penicillin G     [] Ampicillin   [] Cefazolin        [] Ceftriaxone     [] Ceftazidime     [] Cefepime     [] Cefuroxime  [] Bactrim  [] Iodixanol     [] Iopamidol        [] Iohexol  [] Others:   [] Patient's Own:   Order for N/A as test date      Atopy Screen (Placed Oct 18, 2024)  No Substance Readings (15 min) Evaluation   POS Histamine 1mg/ml ++    NEG NaCl 0.9% -      No Substance Readings (15 min) Evaluation   1 Alternaria alternata (tenuis)  -    2 Cladosporium herbarum -    3 Aspergillus fumigatus -    4 Penicillium notatum -    5 Dermatophagoides pteronyssinus -    6 Dermatophagoides farinae -    7 Dog epithelium (canis spp) -    8 Cat hair (phi catus) -    9 Cockroach   (Blatella americana & germanica) -    10 Grass mix [facility]   (Chantal, Orchard, Redtop, Patel) -    11 Gaurang grass (sorghum halepense) -    12 Weed mix   (common Cocklebur, Lamb s quarters, rough redroot Pigweed, Dock/Sorrel) -    13 Mug wort (artemisia vulgare) -    14 Ragweed giant/short (ambrosia spp) -    15 White birch (Betula papyrifera) -    16 Tree mix 1 (Pecan, Maple BHR, Oak RVW, american [place], black [place]) -    17 Red cedar (juniperus virginia) -    18 Tree mix 2   (white [place], river/red Birch, black [place], common  [place]ake, american Elm) -    19 Box elder/Maple mix (acer spp) -    20 [place] shagbark (carya ovata) -    Conclusion:      Assessment & Plan:    ==> Final Diagnosis:     # Chronic recurrent hair loss with LPP/FFA  Ruling out underlying contact allergy aggravating conditions  Ruling out underlying atopy aggravation conditions  * chronic illness with exacerbation, progression, side effects from treatment    # Atopic predisposition? with:   Positive FHx  No clear signs for atopy based on history and negative prick tests    These conclusions are made at the best of one's knowledge and belief based on the provided evidence such as patient's history and allergy test results and they can change over time or can be incomplete because of missing information.    ==> Treatment Plan:    >> Until patch tests can be completed, continue treatment regimen set with Dr. Esquivel.    Procedures Performed: Allergy prick tests    Staff Involved: Provider, Staff, and Scribe    Scribe Disclosure:   I, BROOKLYN PARRISH, am serving as a scribe to document services personally performed by Walt Mclaughlin MD based on data collection and the provider's statements to me.     Staff Physician Comments:  I was present with the scribe who participated in the documentation of the note. I have verified the history and personally performed the physical exam and medical decision making. I agree with the assessment and plan as documented in the note. I have reviewed and if necessary amended the note.      Walt Mclaughlin MD  Professor  Head of Dermato-Allergy Division  Department of Dermatology  [facility]      Follow-up in Derm-Allergy clinic for patch tests in 4/2025 or 5/2025  Day 3 of patch tests can be virtual  If she follows up with Dr. Esquivel around that time, would recommend she schedule follow up for after day 5 appointment time and then she can do repeat ILK injections if still needed  - after today's  follow-up with Dr. Esquivel, next scheduled to follow up with Dr. Esquivel on 2/21/25 for another ILK injection    I spent a total of 35 minutes with Maria Ines Coreas. This time was spent counseling the patient and/or coordinating care, explaining the allergy tests, performing allergy tests and assessing the clinical relevance.

## 2024-10-18 NOTE — PATIENT INSTRUCTIONS
_____________________________    PATCH TESTING    WHAT IS A PATCH TEST ?    A patch test is a way of identifying whether a substance has caused a delayed reaction with skin inflammation, such as contact eczema or delayed (after days) reactions to drugs. We will use various different types of test compounds, which may include common allergens in occupation and daily life such as  preservatives, fragrances or even drugs. Most of the time we will use standardized, prefabricated test solutions and the choice of the substances and series tested will depend on the history of the allergic reactions. Sometimes we will test your own products you are exposed at home or at work. In order to avoid severe toxic reactions we need detailed information s or safety sheets about each of these test compounds.    HOW IS A PATCH TEST PERFORMED ?    You will be given three appointments to complete this test. On the first appointment the nurse will apply 100-180 small test chambers each one of them containing a different allergen on your back and/or on your arms. We will use hypoallergenic and somehow waterproof adhesive tape. Afterwards the different sites will be marked with a waterproof marker. These patches must stay in place for 2 days. On the second appointment the patches will be removed and the allergy doctor/nurse will evaluate the skin reactions and maybe re-apply the marks. On the third appointment the allergy doctor will re-evaluate possible allergic reactions and discuss with you the nature of the allergens you react to and how to avoid them.    AVOID THE FOLLOWING:    DO NOT wash your back and other test areas during the entire test period (3-5 days), NO bathing or swimming  AVOID strenuous exercise with sweating  DO NOT scratch the test area  AVOID exposure to UV irradiation (sun, therapy)    Patch tests should be performed when the allergy is resolved  Remove patch tests only if severe reaction (itch, pain, blistering)  and report to your doctor immediately. Brandon then the locations of each test field  If patch tests peel off, then try to fix them and record time and brandon test field  No oral steroids (e.g. Prednisone) 1 month prior to tests    WHAT ARE THE POSSIBLE RISKS OF PATCH TESTS ?    If you are allergic to a compound tested you will develop after a few days localized skin reactions similar to your previous allergic reaction. This includes formation of red, itchy skin lesions of few mm to cm with small vesicles and even blisters. The lesions will fade off over days and weeks and might sometimes leave for a few months some skin pigmentations. In rare cases a localized reaction to patch tests can become generalized. The tests with your own products might have some risks because they are not standardized and unanticipated reactions can occur. We need as much information as possible to evaluate if your own products are safe to test and under what conditions. This has to be evaluated for each individual product.        ? WHAT ARE THE PREPARATIONS NEEDED FOR THESE VARIOUS ALLERGY TESTS ?    Some medications can affect the reactions to allergens during the tests. Therefore reveal all the medications you take to the allergy team and the doctor will discuss with you the medications before/during the tests. Normally, you have to respect following rules (unless otherwise instructed by doctor):  For prick, intradermal and provocation tests stop antihistamines (e.g. loratadine (Claritin), cetirizine (Zyrtec), fexofenadine (Allegra) etc 1 week prior to the appointment   For patch tests and provocation tests, stop systemic corticosteroids 1 month and topical steroids 1 week prior to tests  Eat normally and take a shower before testing begins (do not put anything, including lotion, on the back after showering)    _____________________________    SKIN PRICK TESTING    WHAT IS A SKIN PRICK TEST (SPT) ?    A skin prick test (SPT) is a simple and  reliable diagnostic test used to identify substances ( allergens ) responsible for triggering the symptoms of allergy. The basic SPT panel includes common allergens, such as house dust mites, molds, dog and cat allergens and grass/tree pollen allergens. We have other more specialized series for various food allergens and sometimes we test your own food directly on your skin. Tests will be usually performed on the skin of the forearm (rarely on back). The skin may develop a red and itchy reaction which can be an indication of an allergy to to tested substance, but will be confirmed by discussion with the allergy specialist    HOW IS A SPT PERFORMED ?    The skin will be disinfected with 70% Isopropanol alcohol, then marked and numbered with a pen to identify the areas where the specific allergens will be tested. Afterwards a drop of the allergen solution will be placed on the skin and then the skin gently pricked using the tip of a specially designed prick needle. With this procedure the most superficial part of the skin will be pierced to allow the test solution to diffuse into the skin and make contact with the reactive immune cells. After 15-30min the area will be examined and evaluated for possible allergic reactions.        WHAT ARE THE POSSIBLE RISKS OF SPT ?    If the skin reacts it will develop red, itchy wheals of 5-30mm diameter. The wheal and itch will usually disappear spontaneously after 1-2 hours. Sometimes there might be a delayed reactions after days and this has to be reported to the treating allergy specialist (could be another kind of reaction pattern and important piece of information). Rarely there are more serious generalized allergic reactions observed and therefore you will be under observation of the allergy team during the entire procedure. There is a small risk for some bleeding and skin infection by the SPT.    _____________________________    INTRADERMAL TESTS      WHAT IS AN INTRADERMAL  TEST (IDT) ?    An intradermal test (IDT) is basically a continuation of the SPT and is sometimes proposed if the skin prick test is negative and the person is strongly suspected to have an allergy to a particular substance. IDT is particularly used for diagnosis of drug allergies and only sterile solutions will be tested by IDT. Because more allergen is delivered to the skin than in SPT the IDT can add more sensitivity to the allergy tests, but with a higher potential risk.     HOW IS AN INTRADERMAL TEST (IDT) PERFORMED ?    A small amount (~ 0.05ml) of the suspected allergen is injected with a very fine insulin needle just beneath the skin. The injections are made at spaced intervals after disinfection and marking of the skin areas. The tests are usually performed on the forearm (rarely back). The initial test will be started with a very diluted solution and if the results are negatives the procedure might be repeated with serial dilutions of higher concentrations. Therefore, the estimated duration of this test is about 45-60 min. Sometimes we observe delayed type reactions to the intradermal tests after 1-4 days and if this is the case take a photo and inform our staff and load the photo into [employer]. Best would be to take the photos with a ruler that we know at which position the positive test was.          WHAT ARE THE POSSIBLE RISKS OF IDT ?    If the skin reacts it will develop red, itchy wheals of 5-30mm diameter. The wheal and itch will usually disappear spontaneously after 1-2 hours. Sometimes there might be a delayed reactions after days and this has to be reported to the treating allergy specialist (could be another kind of reaction pattern and important piece of information). Rarely there are more serious generalized allergic reactions observed and therefore you will be under observation of the allergy team during the entire procedure. Only sterile solutions will be used for injections, but there is  still a small risk for infections or unpredictable local toxic reactions by the allergens. Some transient bleeding might occur.     Useful Contact Information    Change of appointments or allergy-related [phone]  Email: [email or web address]  Location/address: [address] [place] [id number]    If you develop any serious or adverse allergic reaction after office hours please seek immediate medical assistance at the nearest clinic or emergency room.     If you have questions or concerns regarding your Allergy Clinic bill or cost of care estimates, please reach out to our financial services at [email or web address]    CPT code for patch testing: CPT-04508 (Contact your insurance provider to ensure coverage)    Customer Service    Ph: [phone] or  [phone]    Hours of operation:   - 8:00am - 5:30pm  F 9:00am - 4:30pm    Cost of Care/Estimates Team    Ph: [phone]    Hours of operation:   - 8:00am - 3:00pm  F 9:00am - 3:00pm

## 2024-10-18 NOTE — LETTER
"10/18/2024       RE: Maria Ines Coreas  [address] [place] [address]     Dear Colleague,    Thank you for referring your patient, Maria Ines Coreas, to the [facility] DERMATOLOG[facility] [place] at [facility]. Please see a copy of my visit note below.    [facility] Dermatology Note  Encounter Date: Oct 18, 2024  Office Visit     Dermatology Problem List:  1. FFA/LPP s/p bx 2018  - Current tx: doxycycline 100 mg BID, finasteride 5 mg daily,  mg BID (has annual optho exams for herpes zoster ophthalmicus as well), oral minoxidil 2.5 mg daily  - ILK:  2.5mg on 4/01/22, 5mg on 8/5/22, 10 mg on 10/06/23, 20 mg (02/20/24, 6/14/24), 10mg (10/18/2024)  - Adjunct tx: ketoconazole, fluocinonide, Latisse (brows), tofacitinib solution  - Past tx: tacrolimus solution (burning/irritation), naltrexone, topical tofacitinib (caused burning/itch)  - Future considerations: excimer, isotretinoin/acitretin, mycophenolate  - nbUVB device not covered     SHx: [place] in [place]  ____________________________________________     Assessment & Plan:     # 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 Patient report of more shedding since her last visit but does note more work related stress so could be some component of TE. She is mostly active on mid frontal scalp. We did also stop doxycycline last visit.  Discussed risks and benefits of further therapy options, including mycophenolate or Methotrexate. She previously tried MMF with her dermatologist in [place] but stopped due to prior notes indicating \"shortness of breath\" although does not recall this and would be open to re-trying MMF. We also discussed adding back in doxycycline because she has noticed more shedding since this was discontinued. For now will restart doxy and if she continues " to progress can consider MMF or MTX at her next visit. She also met with Dr. Mclaughlin today and plans to get patch testing next Spring.   - ILK 10 injections performed today. See procedure note below.  - Restart doxycycline 100 mg BID  - Safety labs today in anticipation of starting immunosuppressive med at next visit (CBC, CMP, hepatitis, HIV, TB)  - Continue hydroxychloroquine 200 mg BID  - Repeat safety labs for hydroxychloroquine today (CBC and CMP); continue annual eye exams with optho (last 4/2024)  - Continue oral minoxidil 2.5 mg daily  - Continue finasteride 5 mg daily  - Continue ketoconazole 2% shampoo 3 times per week, alternating with T-sal shampoo  - Continue fluocinonide solution nightly     Procedures Performed:   - Intra-lesional triamcinolone procedure note. After verbal consent and review of risk of pain and skin thinning/atrophy, positioning and cleansing with isopropyl alcohol, 2 total mL of triamcinolone 10 mg/mL was injected into 20 lesion(s) on the scalp. The patient tolerated the procedure well and left the dermatology clinic in good condition.    Follow-up: February as previously scheduled    Staff and Scribe:     Scribe Disclosure:   IJolene, am serving as a scribe; to document services personally performed by Alana Esquivel MD -based on data collection and the provider's statements to me.       Staff and Resident:    Tito THOMPSON MD, discussed and evaluated the patient with Dr. Esquivel.    Staff Physician Comments:   I saw and evaluated the patient with the resident and I agree with the assessment and plan.  I was present for the entire minor procedure and examination.    Alana Esquivel MD    Department of Dermatology  [facility]: Phone: [phone], Fax: [phone]  10/24/2024         ____________________________________________    CC: Derm Problem and Hair Loss (Has  been a lot worse than last appt, much more shedding noticed. Today there has been less shedding noticed which is out of the ordinary for pt.)    HPI:  Ms. Maria Ines Coreas is a(n) 48 year old female who presents today as a return patient for LPP/FFA. She tried tofacitinib solution for a month but felt this caused more redness and itching so she stopped. Only apply the fluocinonide solution at night. Reports a lot more shedding since her last visit. Has a lot of itching on center of scalp but feels this is about the same as usual. Sometimes gets burning with application of the cluocinonide solution. Using keto shampoo three times per week alternating with T-sal. She did not notice much improvement with hair shedding after injections at her last visit. She also goes to a local dermatologist in [place] that last did injections in August and didn;t think those helped wither. No change in eyebrow or lash density. Patient is otherwise feeling well, without additional skin concerns.    Labs Reviewed:  N/A    Physical exam:  Vitals: /72 (BP Location: Right arm)   GEN: This is a well developed, well-nourished female in no acute distress, in a pleasant mood.    - There is erythema and perifollicular scale most prominent on frontal midline scalp and frontal parietal scalp  - Frontotemporal band of alopecia, with some maykel fibers of regrowth.  - Patchy hair loss of eyebrows and some focal areas of loss on eyelids  - No other lesions of concern on areas examined.                                     Medications:  Current Outpatient Medications   Medication Sig Dispense Refill     bimatoprost (LUMIGAN) 0.03 % ophthalmic solution Apply to eyebrows nightly 5 mL 6     COMPOUNDED NON-CONTROLLED SUBSTANCE (CMPD RX) - PHARMACY TO MIX COMPOUNDED MEDICATION Apply to scalp twice daily as needed 10 mL 3     doxycycline monohydrate (MONODOX) 100 MG capsule Take 1 capsule (100 mg) by mouth 2 times daily. 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 30 g 3     hydroxychloroquine (PLAQUENIL) 200 MG tablet Take 1 tablet (200 mg) by mouth 2 times daily 180 tablet 3     ketoconazole (NIZORAL) 2 % external shampoo Apply topically daily as needed for itching or irritation 120 mL 11     minoxidil (LONITEN) 2.5 MG tablet Take 1 tablet by mouth once daily 90 tablet 3     prednisoLONE acetate (PRED FORTE) 1 % ophthalmic suspension INSTILL 1 DROP INTO EACH EYE THREE TIMES A WEEK       tacrolimus (PROTOPIC) 0.1 % external ointment Apply 1-2 times daily to scalp. 60 g 6     valACYclovir (VALTREX) 500 MG tablet Take 500 mg by mouth daily       Current Facility-Administered Medications   Medication Dose Route Frequency Provider Last Rate Last Admin     triamcinolone acetonide (KENALOG-10) injection 10 mg  10 mg Intra-Lesional Once Renee Aparicio MD         triamcinolone acetonide (KENALOG-10) injection 5 mg  5 mg Intra-Lesional Once Renee Aparicio MD          Past Medical History:   There is no problem list on file for this patient.    History reviewed. No pertinent past medical history.     CC Kleber Angel MD  No address on file on close of this encounter.      Again, thank you for allowing me to participate in the care of your patient.      Sincerely,    Alana Esquivel MD

## 2024-10-18 NOTE — LETTER
10/18/2024      Maria Ines Coreas  [address] [place] [address]      Dear Colleague,    Thank you for referring your patient, Maria Ines Coreas, to the [facility] ALLERGY CLIN[facility] [place]. Please see a copy of my visit note below.    [facility] Dermato-allergology Note  Office visit  Encounter Date: Oct 18, 2024  ____________________________________________    CC: Allergy Consult (See MD note.)      HPI:  (Oct 18, 2024)  Ms. Maria Ines Coreas is a(n) 48 year old female who presents today as new patient for allergy consultation  - Referred by Dr. Esquivel on 6/14/24 for LPP/FFA with stable hair loss but persistent erythema/scale and pruritus so wanting to rule out allergic contact dermatitis as a component  - Scheduled to follow up with Dr. Esquivel at 09:30  - Has had scalp issues for approaching 10 years  - Frontal scalp is itchy and sometimes scaly and dry  - Has never noticed involvement on any other part of back  - No other skin involvement  - Otherwise feeling well in usual state of health    Physical Exam:  General: In no acute distress, well-developed, well-nourished  Eyes: no conjunctivitis  ENT: no signs of rhinitis   Pulmonary: no wheezing or coughing  Skin: Focused examination of the skin on test sites was performed = see test results below  Focused examination of the scalp was performed.  - On the front scalp, no signs of inflammation or desquamation.   - On the occipital scalp, no signs of erythema or desquamation.    Past Medical History:   There is no problem list on file for this patient.    No past medical history on file.    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 30 g 3     hydroxychloroquine (PLAQUENIL) 200 MG tablet Take 1 tablet (200 mg) by mouth 2 times daily 180 tablet 3     ketoconazole (NIZORAL) 2 % external shampoo Apply topically daily as needed for itching or irritation 120 mL 11     minoxidil (LONITEN) 2.5 MG tablet Take 1 tablet by mouth once daily 90 tablet 3     prednisoLONE acetate (PRED FORTE) 1 % ophthalmic suspension INSTILL 1 DROP INTO EACH EYE THREE TIMES A WEEK       tacrolimus (PROTOPIC) 0.1 % external ointment Apply 1-2 times daily to scalp. 60 g 6     valACYclovir (VALTREX) 500 MG tablet Take 500 mg by mouth daily       Current Facility-Administered Medications   Medication Dose Route Frequency Provider Last Rate Last Admin     triamcinolone acetonide (KENALOG-10) injection 10 mg  10 mg Intra-Lesional Once Renee Aparicio MD         triamcinolone acetonide (KENALOG-10) injection 5 mg  5 mg Intra-Lesional Once Renee Aparicio MD         Previous Labs, Allergy Tests, Dermatopathology, Imaging:  [Upon review of Epic chart, no prior allergy records as of 10/18/24.]     6/14/24 Dr. Alana Esquivel (derm) - most recent derm visit in [facility]  DERM PROBLEM LIST:  1. FFA/LPP s/p bx 2018  - Current tx: finasteride 5 mg daily,  mg BID (has annual optho exams for herpes zoster ophthalmicus as well), oral minoxidil 2.5 mg daily  - ILK:  2.5mg on 4/01/22, 5mg on 8/5/22, 10 mg on 10/06/23, 20 mg (02/20/24, 6/14/24)  - Adjunct tx: ketoconazole, fluocinonide, Latisse (brows), tofacitinib solution  - Past tx: tacrolimus solution (burning/irritation), naltrexone, doxy (ineffective)  - Future considerations: excimer, isotretinoin/acitretin, mycophenolate  - nbUVB device not  covered     SHx: [place] in [place]    FROM HPI:  - Today reports things are going okay; today is a pretty good day. She tried the tacrolimus solution but discontinued due to burning and irritation. Not sure if she notices much difference with the doxycycline  - Continues doxycycline 100 mg BID, hydroxychloroquine 200 mg BID (last eye exam in April), oral minoxidil 2.5 mg daily, finasteride 5 mg daily, Lidex solution, and keto shampoo & T-Sal shampoo (alternates; washes hair daily).   - Does continue to notices hair loss and inflammation. Today is a relatively good day, but sometimes the pruritus can be pretty bad. Also has associated hair loss of eyebrows and eyelashes.   - ILK helps, but she does notice some atrophy. She says her local derm has been doing ILK 5 instead of 10 and that seems to have similar efficacy with less atrophy    FROM A&P:  # FFA/LPP s/p bx 2018  Overall with continued mild-moderate disease activity but fairly stable in comparison to prior photos. Also with ongoing symptoms of intermittent pruritus. Discussed the risks and benefits of further therapy options, including mycophenolate, tofacitinib solution, or switching finasteride to dutasteride. Also reviewed the possibility of allergic contact dermatitis given evidence of persistent inflammation but stable hair loss.  - ILK 10 injections performed today (will plan to go down to ILK 5 in the future). See procedure note below.  - Referral placed for patch testing to r/o ACD component  - Stop doxycycline given lack of significant response  - Start tofacitinib solution BID as needed (to [place])  - Continue hydroxychloroquine 200 mg BID  - Repeat safety labs for hydroxychloroquine (CBC and CMP); continue annual eye exams with optho (last 4/2024)  We forgot to have pt go down to lab so will contact her and see if she wants to get labs closer to home  - Continue oral minoxidil 2.5 mg daily  - Continue finasteride 5 mg daily  - Continue  ketoconazole 2% shampoo 3 times per week, alternating with T-sal shampoo  - Continue fluocinonide solution nightly        Referred By: Alana Esquivel MD (derm)  [address] [place] [address]     Allergy Tests:  Past Allergy Test    Family History:  No family history on file.  Dad and now her son (16 y/o) with very dry, cracking hands.  Mom has pollen allergies/hay fever.  Daughter has runny/drippy nose and takes allergy medication regularly.    Social History:  The patient works as an , currently in more of a supervisory position. Patient has the following hobbies or non-occupational exposure: run.  Does not work with paint/art work.    Order for Future Allergy Testing:    [x] Outpatient  [] Inpatient: [facility]..../ Bed ...    Skin Atopy (atopic dermatitis)? [] Yes     [x] No  Comments:   Childhood eczema?   [] Yes     [x] No  Comments:   Hand eczema?   [] Yes     [x] No  If yes, leading hand? [] Right     [] Left     [] Ambidextrous  Comments:     Contact allergies?   [x] Yes     [] No  Comments: adds hi-lites to her hair  Including adhesives/bandages? [] Yes     [x] No  Comments:   Including metals?   [x] Yes     [] No  Comments:   - wears a few rings currently  - used to have more ear piercings, but ears would get red and inflamed, so stopped wearing them    Drug allergies?   [] Yes     [x] No  Comments:     Angioedema?   [] Yes     [x] No  Comments:     Urticaria?   [x] Yes     [] No  Comments: has at some point in her life, rarely and a handful of time, did not last long and she is unaware of trigger    Food allergies?   [] Yes     [x] No  Comments:     Pet allergies?   [] Yes     [x] No  Comments: has 1 dog and 1 cat; has had dog and/or cat for >20 years, and has never noticed reactions around them    Environmental allergy symptoms?  [] Conjunctivitis  [] Otitis  [] Pharyngitis  [] Polyposis  [] Postnasal Drip  [] Rhinitis  [] Sinusitis  [x] None  Comments: many years ago, only had to take an  allergy pill for 1 year when she had watery feeling in ears    HENT Operations?  [] Adenoids [] Septum [] Sinus  [] Tonsils        [x] Other: wisdom teeth extraction  [] None  Comments:     Pulmonary symptoms (from birth to present)?  [] Asthma bronchiale  Inhaler(s)?:   [] Coughing  [] Other  [x] None  Comments:     Environmental and pulmonary symptoms aggravated by?  Season: [x] None     [] I     [] II     [] III     [] IV     [] V     [] VI          [] VII     [] VIII     [] IX     [] X     [] XI     [] XII     [] Perennial  Time of Day: [x] None     [] Morning     [] Noon     [] Evening     [] Night     [] Whole Day  Location/Changes: [x] None     [] Inside     [] Outside     [] Mountain     [] Sea     [] Other:   Triggers (specific): [x] None     [] Animals     [] Dust     [] Mold     [] Plants     [] Other:   Triggers (other): [x] None     [] Psyche     [] Sport     [] Work     [] Other:   Irritant: [x] None     [] Cold     [] Heat     [] Odors     [] Physical Efforts     [] Smoke     [] Other:     Order for PATCH TESTS  Reason for tests (suspected allergy): ruling out ACD component of chronic, recurrent, therapy-resistant LPP/FFA  Known previous allergies: none  Standardized panels  [x] Standard panel (40 tests)  [x] Preservatives & Antimicrobials (31 tests)  [x] Emulsifiers & Additives (25 tests)   [x] Perfumes/Flavours & Plants (25 tests)  [x] Hairdresser panel (12 tests)  [] Rubber Chemicals (22 tests)  [] Plastics (26 tests)  [] Colorants/Dyes/Food additives (20 tests)  [] Metals (implants/dental) (24 tests)  [] Local anaesthetics/NSAIDs (13 tests)  [] Antibiotics & Antimycotics (14 tests)   [x] Corticosteroids (15 tests)   [] Photopatch test (62 tests)   [] Others:     [] Patient's Own Products:   DO NOT test if chemical or biological identity is unknown!   always ask from patient the product information and safety sheets (MSDS)       Order for PRICK TESTS    Reason for tests (suspected allergy): atopy?  No clear signs in history other than positive FHx  Known previous allergies: none    Standardized prick panels  [x] Atopic panel (20 tests)  [] Pediatric Panel (12 tests)  [] Milk, Meat, Eggs, Grains (20 tests)   [] Dust, Epithelia, Feathers (10 tests)  [] Fish, Seafood, Shellfish (17 tests)  [] Nuts, Beans (14 tests)  [] Spice, Vegetable, Fruit (17 tests)  [] Pollen Panel = Tree, Grass, Weed (24 tests)  [] Others:     [] Patient's Own Products:   DO NOT test if chemical or biological identity is unknown!   always ask from patient the product information and safety sheets (MSDS)     Standardized intradermal tests  [] Alternaria alternata  [] Aspergillus fumigatus  [] Cladosporium herbarum   [] Penicillium notatum  [] Dermatophagoides farinae  [] Dermatophagoides pteronyssinus  [] Dog Epithelium  [] Cat Epithelium  [] Others:     [] Bee venom   [] Wasp venom  !!Specific protocol with dilutions!!       Order for Drug allergy tests (prick & intradermal & patch tests)    [] Penicillin G     [] Ampicillin   [] Cefazolin        [] Ceftriaxone     [] Ceftazidime     [] Cefepime     [] Cefuroxime  [] Bactrim  [] Iodixanol     [] Iopamidol        [] Iohexol  [] Others:   [] Patient's Own:   Order for N/A as test date      Atopy Screen (Placed Oct 18, 2024)  No Substance Readings (15 min) Evaluation   POS Histamine 1mg/ml ++    NEG NaCl 0.9% -      No Substance Readings (15 min) Evaluation   1 Alternaria alternata (tenuis)  -    2 Cladosporium herbarum -    3 Aspergillus fumigatus -    4 Penicillium notatum -    5 Dermatophagoides pteronyssinus -    6 Dermatophagoides farinae -    7 Dog epithelium (canis spp) -    8 Cat hair (phi catus) -    9 Cockroach   (Blatella americana & germanica) -    10 Grass mix midwest   (Chantal, Orchard, Redtop, Patel) -    11 Gaurang grass (sorghum halepense) -    12 Weed mix   (common Cocklebur, Lamb s quarters, rough redroot Pigweed, Dock/Sorrel) -    13 Mug wort (artemisia vulgare) -    14  Ragweed giant/short (ambrosia spp) -    15 White birch (Betula papyrifera) -    16 Tree mix 1 (Pecan, Maple BHR, Oak RVW, american [place], black [place]) -    17 Red cedar (juniperus virginia) -    18 Tree mix 2   (white [place], river/red Birch, black [place], common [place], american Elm) -    19 Box elder/Maple mix (acer spp) -    20 [place]christ shagbark (carya ovata) -    Conclusion:      Assessment & Plan:    ==> Final Diagnosis:     # Chronic recurrent hair loss with LPP/FFA  Ruling out underlying contact allergy aggravating conditions  Ruling out underlying atopy aggravation conditions  * chronic illness with exacerbation, progression, side effects from treatment    # Atopic predisposition? with:   Positive FHx  No clear signs for atopy based on history and negative prick tests    These conclusions are made at the best of one's knowledge and belief based on the provided evidence such as patient's history and allergy test results and they can change over time or can be incomplete because of missing information.    ==> Treatment Plan:    >> Until patch tests can be completed, continue treatment regimen set with Dr. Esquivel.    Procedures Performed: Allergy prick tests    Staff Involved: Provider, Staff, and Scribe    Scribe Disclosure:   I, BROOKLYN PARRISH, am serving as a scribe to document services personally performed by Walt Mclaughlin MD based on data collection and the provider's statements to me.     Staff Physician Comments:  I was present with the scribe who participated in the documentation of the note. I have verified the history and personally performed the physical exam and medical decision making. I agree with the assessment and plan as documented in the note. I have reviewed and if necessary amended the note.      Walt Mclaughlin MD  Professor  Head of Dermato-Allergy Division  Department of Dermatology  [facility]      Follow-up in Derm-Allergy clinic for patch tests  in 4/2025 or 5/2025  Day 3 of patch tests can be virtual  If she follows up with Dr. Esquivel around that time, would recommend she schedule follow up for after day 5 appointment time and then she can do repeat ILK injections if still needed  - after today's follow-up with Dr. Esquivel, next scheduled to follow up with Dr. Esquivel on 2/21/25 for another ILK injection    I spent a total of 35 minutes with Maria Ines Coreas. This time was spent counseling the patient and/or coordinating care, explaining the allergy tests, performing allergy tests and assessing the clinical relevance.       Again, thank you for allowing me to participate in the care of your patient.        Sincerely,        Walt Mclaughlin MD

## 2024-10-19 LAB
QUANTIFERON MITOGEN: 9.92 IU/ML
QUANTIFERON NIL TUBE: 0.02 IU/ML
QUANTIFERON TB1 TUBE: 0.08 IU/ML
QUANTIFERON TB2 TUBE: 0.07

## 2024-10-20 LAB
GAMMA INTERFERON BACKGROUND BLD IA-ACNC: 0.02 IU/ML
M TB IFN-G BLD-IMP: NEGATIVE
M TB IFN-G CD4+ BCKGRND COR BLD-ACNC: 9.9 IU/ML
MITOGEN IGNF BCKGRD COR BLD-ACNC: 0.05 IU/ML
MITOGEN IGNF BCKGRD COR BLD-ACNC: 0.06 IU/ML

## 2024-12-14 ENCOUNTER — HEALTH MAINTENANCE LETTER (OUTPATIENT)
Age: 49
End: 2024-12-14

## 2025-04-17 ENCOUNTER — TELEPHONE (OUTPATIENT)
Dept: ALLERGY | Facility: CLINIC | Age: 50
End: 2025-04-17
Payer: COMMERCIAL

## 2025-04-17 NOTE — TELEPHONE ENCOUNTER
Standardized panels  [x] Standard panel (40 tests)  [x] Preservatives & Antimicrobials (31 tests)  [x] Emulsifiers & Additives (25 tests)   [x] Perfumes/Flavours & Plants (25 tests)  [x] Hairdresser panel (12 tests)  [] Rubber Chemicals (22 tests)  [] Plastics (26 tests)  [] Colorants/Dyes/Food additives (20 tests)  [] Metals (implants/dental) (24 tests)  [] Local anaesthetics/NSAIDs (13 tests)  [] Antibiotics & Antimycotics (14 tests)   [x] Corticosteroids (15 tests)   [] Photopatch test (62 tests)   [] Others:   STANDARD Series                                          # Substance 2 days 4 days remarks     1 Graham Mix III 10% - -       2 Colophony - -       3  2-Mercaptobenzothiazole  - -       4 Methylisothiazolinone - -       5 Carba Mix - -       6 Thiuram Mix [A] - -       7 Bisphenol A Epoxy Resin - -       8 F-Mqjg-Gdtiujddzwn-Formaldehyde Resin - -       9 Mercapto Mix [A] - -       10 Black Rubber Mix- PPD [B] - -       11 Potassium Dichromate  -  -       12 Balsam of [place] (Myroxylon Pereirae Resin) - -       13 Nickel Sulphate Hexahydrate - -       14 Mixed Dialkyl Thiourea - -       15 Paraben Mix [B] - -       16 Methyldibromo Glutaronitrile - -       17 Fragrance Mix 8% - -       18 2-Bromo-2-Nitropropane-1,3-Diol (Bronopol) CT - -       19 Lyral - -       20 Tixocortol-21- Pivalate CT - -       21 Diazolidinyl urea (Germall II) - -        22 Methyl Methacrylate - -       23 Cobalt (II) Chloride Hexahydrate - -       24 Fragrance Mix II  - -       25 Compositae Mix II - -       26 Benzoyl Peroxide - -       27 Bacitracin - -       28 Formaldehyde - -       29 Methylchloroisothiazolinone / Methylisothiazolinone - -       30 Corticosteroid Mix CT - -       31 Sodium Lauryl Sulfate - -       32 Lanolin Alcohol - -       33 Turpentine - -       34 Cetylstearylalcohol - -       35 Chlorhexidine Dicluconate - -       36 Budesonide - -       37 Imidazolidinyl Urea  - -       38 Ethyl-2 Cyanoacrylate - -        39 Quaternium 15 (Dowicil 200) - -       40 Decyl Glucoside - -     Compositae Mix II - common yarrow, mountain arnica, Bulgarian chamomile, feverfew, and the common tansy   PRESERVATIVES & ANTIMICROBIALS        # Substance 2 days 4 days remarks   41 1 1,2-Benzisothiazoline-3-One, Sodium Salt - -     2 1,3,5-Maldonado (2-Hydroxyethyl) - Hexahydrotriazine (Grotan BK) - -     3 Dichlorophene - -     4 3, 4, 4' - Triclocarban - -    45 5 4 - Chloro - 3 - Cresol - -     6 4 - Chloro - 4 - Xylenol (PCMX) - -     7 7-Ethylbicyclooxazolidine (Bioban HK7546) - -     8 Benzalkonium Chloride CT - -     9 Benzyl Alcohol - -    50 10 Cetalkonium Chloride - -     11 Cetylpyrimidine Chloride  - -     12 Chloroacetamide - -     13 DMDM Hydantoin - -     14 Glutaraldehyde - -    55 15 Triclosan - -     16 Glyoxal Trimeric Dihydrate - -     17 Iodopropynyl Butylcarbamate - -     18 Octylisothiazoline - -     19 Acetophenone Azine - -    60 20 Bioban P 1487 (Nitrobutyl) Morpholine/(Ethylnitro-Trimethylene) Dimorpholine - -     21 Phenoxyethanol - -     22 Phenyl Salicylate - -     23 Povidone Iodine - -     24 Sodium Benzoate - -    65 25 Sodium Disulfite - -     26 Sorbic Acid - -     27 Thimerosal - -     28 Melamine Formaldehyde Resin - -     29 Ethylenediamine Dihydrochloride - -      Parabens      70 30 Butyl-P-Hydroxybenzoate - -     31 Ethyl-P-Hydroxybenzoate - -     32 Methyl-P-Hydroxybenzoate - -    73 33 Propyl-P-Hydroxybenzoate - -     EMULSIFIERS & ADDITIVES       # Substance 2 days 4 days remarks   74 1 Polyethylene Glycol-400 - -    75 2 Cocamidopropyl Betaine - -     3 Amerchol L101 - -     4 Propylene Glycol - -     5 Triethanolamine - -     6 Sorbitane Sesquiolate CT - -    80 7 Isopropylmyristate - -     8 Polysorbate 80 CT - -     9 Amidoamine   (Stearamidopropyl Dimethylamine) - -     10 Oleamidopropyl Dimethylamine - -     11 Lauryl Glucoside - -    85 12 Coconut Diethanolamide  - -     13 2-Hydroxy-4-Methoxy  Benzophenone (Oxybenzone) - -     14 Benzophenone-4 (Sulisobenzon) - -     15 Propolis - -     16 Dexpanthenol - -    90 17 Abitol (Hydroabietyl Alcohol) - -     18 Tert-Butylhydroquinone - -     19 Benzyl Salicylate - -     20 Dimethylaminopropylamin (DMPA) - -     21 Zinc Pyrithione (Zinc Omadine)  - -    95 22 Maldonado(Hydroxymethyl) Nitromethane  - -      Antioxidant       23 Dodecyl Gallate - -     24 Butylhydroxyanisole (BHA) - -     25 Butylhydroxytoluene (BHT) - -     26 Di-Alpha-Tocopherol (Vit E) - -    100 27 Propyl Gallate - -     PERFUMES, FLAVORS & PLANTS        # Substance 2 days 4 days remarks   101 1 Benzyl Cinnamate - -     2 Di-Limonene (Dipentene) - -     3 Cananga Odorata (Sesar Kaba) (I) - -     4 Lichen Acid Mix - -    105 5 Mentha Piperita Oil (Peppermint Oil) - -     6 Sesquiterpenelactone mix - -     7 Tea Tree Oil, Oxidized - -     8 Wood Tar Mix - -     9 Abietic Acid - -    110 10 Lavendula Angustifolia Oil (Lavender Oil) - -     11 Fragrance mix II CT * 14% - -      Fragrance Mix I       12 Oakmoss Absolute - -     13 Eugenol - -     14 Geraniol - -    115 15 Hydroxycitronellal - -     16 Isoeugenol - -     17 Cinnamic Aldehyde - -     18 Cinnamic Alcohol  - -      Fragrance mix II       19 Citronellol - -    120 20 Alpha-Hexylcinnamic Aldehyde    - -     21 Citral - -     22 Farnesol - -    123 23 Coumarin - -    Hexylcinnamic aldehyde, Coumarin, Farnesol, Hydroxyisohexy3-cyclohexene carboxaldehyde, citral, citrolellol   HAIRDRESSER        # Substance 2 days 4 days remarks   124 1 P-Phenylenediamine -  -    125 2 P-Aminodiphenylamine - -     3 P-Toluenediamine Sulphate  -  -     4 Ammonium Thioglycolate - -     5 Ammonium Persulfate - -     6 Resorcinol - -    130 7 3-Aminophenol - -     8 P-Aminophenol - -     9 Glyceryl Monothioglycolate - -    133 10 Pyrogallol - -     CORTICOSTEROIDS   # Substance 2 days 4 days remarks Allergy  class   134 1 Amcinonide - -  B   135 2  Betametasone-17,21 Dipropionate - -  D1    3 Desoximetasone - -  C    4 Betamethasone-17-Valerate - -  D1    5 Hydrocortisone - -  A    6 Clobetasol-17-Propionate - -  D1   140 7 Dexamethasone-21-Phosphate Disodium Salt - -  C    8 Hydrocortisone-17 Butyrate - -  D2    9 Prednisolone - -  A    10 Triamcinolone Acetonide - -  B    11 Methylprednisolone Aceponate - -  D2   145 12 Hydrocortisone-21-Acetate - -  A   146 13 Prednicarbate - -  D2     Group Characteristics of group Generic name Name  cross reactions   A Hydrocortisone   Cloprednole, Fludrocortisone acétate, Hydrocortison acetate, Methylprednisolone, Prednisolone, Tixocortolpivalate Alfacortone, Fucidin H, Dermacalm, Hexacortone, Premandole, Imacort With group D2   B Triamcinolone-acetonide   Budenoside (R-isomer), Amcinonide, Desonide, Fluocinolone acetonide, Triamcinolone acetonide Locapred, Locatop  Synalar, Pevisone, [place] -   C Betamethasone (Without Rosenda)   Betamethasone, Dexamethasone, Flumethasone pivalate, Halomethasone Daivobet, Dexasalyl, Locasalen,   -   D1 Betamethasone-diproprionate   Betamethasone dipropionate, Betamethasone-17-valerate, Clobetasole-propionate, Fluticasone propionate, Mometasone furoate Betnovate, Diprogenta, Diprosalic, Diprosone, Celestoderm, Fucicort,  Cutivate, Axotide, Elocom -   D2 Methylprednisolone-aceponate   Hydrocortisone-aceponate, Hydrocortisone-buteprate, Hydrocortisone-17-butyrate, Methylprednisolone aceponate, Prednicarbate Locoïd, Advantan,  Prednitop With group A and Budesonide (S-isomer)      Results of patch tests:                         Interpretation:  - Negative                    A    = Allergic      (+) Erythema    TI   = Toxic/irritant   + E + Infiltration    RaP = Relevance at Present     ++ E/I + Papulovesicle   Rpr  = Relevance Previously     +++ E/I/P + Blister     nR   = No Relevance

## 2025-04-21 ENCOUNTER — OFFICE VISIT (OUTPATIENT)
Dept: ALLERGY | Facility: CLINIC | Age: 50
End: 2025-04-21
Payer: COMMERCIAL

## 2025-04-21 DIAGNOSIS — L66.12 FRONTAL FIBROSING ALOPECIA: ICD-10-CM

## 2025-04-21 DIAGNOSIS — L20.89 OTHER ATOPIC DERMATITIS: ICD-10-CM

## 2025-04-21 DIAGNOSIS — Z88.9 ATOPY: ICD-10-CM

## 2025-04-21 DIAGNOSIS — L66.10 LICHEN PLANOPILARIS: Primary | ICD-10-CM

## 2025-04-21 DIAGNOSIS — L23.5 ALLERGIC DERMATITIS DUE TO OTHER CHEMICAL PRODUCT: ICD-10-CM

## 2025-04-21 PROCEDURE — 95044 PATCH/APPLICATION TESTS: CPT | Performed by: DERMATOLOGY

## 2025-04-21 NOTE — NURSING NOTE
Chief Complaint   Patient presents with    Allergy Testing Followup     Patch day 1     Francoise Saucedo RN

## 2025-04-21 NOTE — LETTER
4/21/2025      Maria Ines Coreas  [address] [place] [address]      Dear Colleague,    Thank you for referring your patient, Maria Ines Coreas, to the [facility] [place]. Please see a copy of my visit note below.    [facility] Dermato-allergology Note  Office visit  Encounter Date: Apr 21, 2025  ____________________________________________    CC: Allergy Testing Followup (Patch day 1)      HPI:  (Apr 21, 2025)  Ms. Maria Ines Coreas is a(n) 49 year old female who presents today as a return patient for allergy tests as planned  - Follow-up in Derm-Allergy clinic for patch tests in 4/2025 or 5/2025  Day 3 of patch tests can be virtual  If she follows up with Dr. Esquivel around that time, would recommend she schedule follow up for after day 5 appointment time and then she can do repeat ILK injections if still needed  - Otherwise feeling well in usual state of health    Physical Exam:  General: In no acute distress, well-developed, well-nourished  Eyes: no conjunctivitis  ENT: no signs of rhinitis   Pulmonary: no wheezing or coughing  Skin: Focused examination of the skin on test sites was performed = see test results below  No active eczematous skin lesions on tests sites, particularly back    Earlier History and Allergy Exams:  (Oct 18, 2024)  Presents today as new patient for allergy consultation  - Referred by Dr. Esquivel on 6/14/24 for LPP/FFA with stable hair loss but persistent erythema/scale and pruritus so wanting to rule out allergic contact dermatitis as a component  - Scheduled to follow up with Dr. Esquivel at 09:30  - Has had scalp issues for approaching 10 years  - Frontal scalp is itchy and sometimes scaly and dry  - Has never noticed involvement on any other part of back  - No other skin involvement     Skin: Focused examination of the skin on test sites was performed = see test results below  Focused examination of the scalp was  performed.  - On the front scalp, no signs of inflammation or desquamation.   - On the occipital scalp, no signs of erythema or desquamation.    Past Medical History:   There is no problem list on file for this patient.    No past medical history on file.    Allergies:  No Known Allergies    Medications:  Current Outpatient Medications   Medication Sig Dispense Refill     bimatoprost (LUMIGAN) 0.03 % ophthalmic solution Apply to eyebrows nightly 5 mL 6     COMPOUNDED NON-CONTROLLED SUBSTANCE (CMPD RX) - PHARMACY TO MIX COMPOUNDED MEDICATION Apply to scalp twice daily as needed 10 mL 3     doxycycline monohydrate (MONODOX) 100 MG capsule Take 1 capsule (100 mg) by mouth 2 times daily. 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 30 g 3     hydroxychloroquine (PLAQUENIL) 200 MG tablet Take 1 tablet (200 mg) by mouth 2 times daily 180 tablet 3     ketoconazole (NIZORAL) 2 % external shampoo Apply topically daily as needed for itching or irritation 120 mL 11     minoxidil (LONITEN) 2.5 MG tablet Take 1 tablet by mouth once daily 90 tablet 3     prednisoLONE acetate (PRED FORTE) 1 % ophthalmic suspension INSTILL 1 DROP INTO EACH EYE THREE TIMES A WEEK       tacrolimus (PROTOPIC) 0.1 % external ointment Apply 1-2 times daily to scalp. 60 g 6     valACYclovir (VALTREX) 500 MG tablet Take 500 mg by mouth daily       Current Facility-Administered Medications   Medication Dose Route Frequency Provider Last Rate Last Admin     triamcinolone acetonide (KENALOG-10) injection 10 mg  10 mg Intra-Lesional Once Renee Aparciio MD         triamcinolone acetonide (KENALOG-10) injection 5 mg  5 mg Intra-Lesional Once Renee Aparicio MD         Previous Labs, Allergy Tests,  Dermatopathology, Imaging:  [Upon review of Epic chart, no prior allergy records as of 10/18/24.]     6/14/24 Dr. Alana Esquivel (derm) - most recent derm visit in [facility]  DERM PROBLEM LIST:  1. 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 She tried the tacrolimus solution but discontinued due to burning and irritation. Not sure if she notices much difference with the doxycycline  - Continues doxycycline 100 mg BID, hydroxychloroquine 200 mg BID (last eye exam in April), oral minoxidil 2.5 mg daily, finasteride 5 mg daily, Lidex solution, and keto shampoo & T-Sal shampoo (alternates; washes hair daily).   - Does continue to notices hair loss and inflammation. Today is a relatively good day, but sometimes the pruritus can be pretty bad. Also has associated hair loss of eyebrows and eyelashes.   - ILK helps, but she does notice some atrophy. She says her local derm has been doing ILK 5 instead of 10 and that seems to have similar efficacy with less atrophy    FROM A&P:  # FFA/LPP s/p bx 2018  Overall with continued mild-moderate disease activity but fairly stable in comparison to prior photos. Also with ongoing symptoms of intermittent pruritus. Discussed the risks and benefits of further therapy options, including mycophenolate, tofacitinib solution, or switching finasteride to dutasteride. Also reviewed the possibility of allergic contact  dermatitis given evidence of persistent inflammation but stable hair loss.  - ILK 10 injections performed today (will plan to go down to ILK 5 in the future). See procedure note below.  - Referral placed for patch testing to r/o ACD component  - Stop doxycycline given lack of significant response  - Start tofacitinib solution BID as needed (to [place])  - Continue hydroxychloroquine 200 mg BID  - Repeat safety labs for hydroxychloroquine (CBC and CMP); continue annual eye exams with optho (last 4/2024)  We forgot to have pt go down to lab so will contact her and see if she wants to get labs closer to home  - Continue oral minoxidil 2.5 mg daily  - Continue finasteride 5 mg daily  - Continue ketoconazole 2% shampoo 3 times per week, alternating with T-sal shampoo  - Continue fluocinonide solution nightly        Referred By: Alana Esquivel MD (derm)  [address] [place] [address]     Allergy Tests:  Past Allergy Test    Family History:  Family History   Problem Relation Age of Onset     Skin Cancer Father      Dad and now her son (16 y/o) with very dry, cracking hands.  Mom has pollen allergies/hay fever.  Daughter has runny/drippy nose and takes allergy medication regularly.    Social History:  The patient works as an , currently in more of a supervisory position. Patient has the following hobbies or non-occupational exposure: run.  Does not work with paint/art work.    Order for Future Allergy Testing:    [x] Outpatient  [] Inpatient: [facility]..../ Bed ...    Skin Atopy (atopic dermatitis)? [] Yes     [x] No  Comments:   Childhood eczema?   [] Yes     [x] No  Comments:   Hand eczema?   [] Yes     [x] No  If yes, leading hand? [] Right     [] Left     [] Ambidextrous  Comments:     Contact allergies?   [x] Yes     [] No  Comments: adds hi-lites to her hair  Including adhesives/bandages? [] Yes     [x] No  Comments:   Including metals?   [x] Yes     [] No  Comments:   - wears a few rings currently  -  used to have more ear piercings, but ears would get red and inflamed, so stopped wearing them    Drug allergies?   [] Yes     [x] No  Comments:     Angioedema?   [] Yes     [x] No  Comments:     Urticaria?   [x] Yes     [] No  Comments: has at some point in her life, rarely and a handful of time, did not last long and she is unaware of trigger    Food allergies?   [] Yes     [x] No  Comments:     Pet allergies?   [] Yes     [x] No  Comments: has 1 dog and 1 cat; has had dog and/or cat for >20 years, and has never noticed reactions around them    Environmental allergy symptoms?  [] Conjunctivitis  [] Otitis  [] Pharyngitis  [] Polyposis  [] Postnasal Drip  [] Rhinitis  [] Sinusitis  [x] None  Comments: many years ago, only had to take an allergy pill for 1 year when she had watery feeling in ears    HENT Operations?  [] Adenoids [] Septum [] Sinus  [] Tonsils        [x] Other: wisdom teeth extraction  [] None  Comments:     Pulmonary symptoms (from birth to present)?  [] Asthma bronchiale  Inhaler(s)?:   [] Coughing  [] Other  [x] None  Comments:     Environmental and pulmonary symptoms aggravated by?  Season: [x] None     [] I     [] II     [] III     [] IV     [] V     [] VI          [] VII     [] VIII     [] IX     [] X     [] XI     [] XII     [] Perennial  Time of Day: [x] None     [] Morning     [] Noon     [] Evening     [] Night     [] Whole Day  Location/Changes: [x] None     [] Inside     [] Outside     [] Mountain     [] Sea     [] Other:   Triggers (specific): [x] None     [] Animals     [] Dust     [] Mold     [] Plants     [] Other:   Triggers (other): [x] None     [] Psyche     [] Sport     [] Work     [] Other:   Irritant: [x] None     [] Cold     [] Heat     [] Odors     [] Physical Efforts     [] Smoke     [] Other:     Order for PATCH TESTS  Reason for tests (suspected allergy): ruling out ACD component of chronic, recurrent, therapy-resistant LPP/FFA  Known previous allergies: none  Standardized  panels  [x] Standard panel (40 tests)  [x] Preservatives & Antimicrobials (31 tests)  [x] Emulsifiers & Additives (25 tests)   [x] Perfumes/Flavours & Plants (25 tests)  [x] Hairdresser panel (12 tests)  [] Rubber Chemicals (22 tests)  [] Plastics (26 tests)  [] Colorants/Dyes/Food additives (20 tests)  [] Metals (implants/dental) (24 tests)  [] Local anaesthetics/NSAIDs (13 tests)  [] Antibiotics & Antimycotics (14 tests)   [x] Corticosteroids (15 tests)   [] Photopatch test (62 tests)   [] Others:     [] Patient's Own Products:   DO NOT test if chemical or biological identity is unknown!   always ask from patient the product information and safety sheets (MSDS)       Order for PRICK TESTS    Reason for tests (suspected allergy): atopy? No clear signs in history other than positive FHx  Known previous allergies: none    Standardized prick panels  [x] Atopic panel (20 tests)  [] Pediatric Panel (12 tests)  [] Milk, Meat, Eggs, Grains (20 tests)   [] Dust, Epithelia, Feathers (10 tests)  [] Fish, Seafood, Shellfish (17 tests)  [] Nuts, Beans (14 tests)  [] Spice, Vegetable, Fruit (17 tests)  [] Pollen Panel = Tree, Grass, Weed (24 tests)  [] Others:     [] Patient's Own Products:   DO NOT test if chemical or biological identity is unknown!   always ask from patient the product information and safety sheets (MSDS)     Standardized intradermal tests  [] Alternaria alternata  [] Aspergillus fumigatus  [] Cladosporium herbarum   [] Penicillium notatum  [] Dermatophagoides farinae  [] Dermatophagoides pteronyssinus  [] Dog Epithelium  [] Cat Epithelium  [] Others:     [] Bee venom   [] Wasp venom  !!Specific protocol with dilutions!!       Order for Drug allergy tests (prick & intradermal & patch tests)    [] Penicillin G     [] Ampicillin   [] Cefazolin        [] Ceftriaxone     [] Ceftazidime     [] Cefepime     [] Cefuroxime  [] Bactrim  [] Iodixanol     [] Iopamidol        [] Iohexol  [] Others:   [] Patient's Own:    Order for N/A as test date      Atopy Screen (Placed Oct 18, 2024)  No Substance Readings (15 min) Evaluation   POS Histamine 1mg/ml ++    NEG NaCl 0.9% -      No Substance Readings (15 min) Evaluation   1 Alternaria alternata (tenuis)  -    2 Cladosporium herbarum -    3 Aspergillus fumigatus -    4 Penicillium notatum -    5 Dermatophagoides pteronyssinus -    6 Dermatophagoides farinae -    7 Dog epithelium (canis spp) -    8 Cat hair (phi catus) -    9 Cockroach   (Blatella americana & germanica) -    10 Grass mix midwest   (Chantal, Orchard, Redtop, Patel) -    11 Gaurang grass (sorghum halepense) -    12 Weed mix   (common Cocklebur, Lamb s quarters, rough redroot Pigweed, Dock/Sorrel) -    13 Mug wort (artemisia vulgare) -    14 Ragweed giant/short (ambrosia spp) -    15 White birch (Betula papyrifera) -    16 Tree mix 1 (Pecan, Maple BHR, Oak RVW, american [place], black Orient) -    17 Red cedar (juniperus virginia) -    18 Tree mix 2   (white [place]erson, river/red Birch, black [place], common [place], american Elm) -    19 Box elder/Maple mix (acer spp) -    20 Bokoshe shagbark (carya ovata) -    Conclusion:     RESULTS & EVALUATION of PATCH TESTS    Apr 21, 2025 application of patch tests:    Patch test readings after     [x] 2 days, [] 3 days [x] 4 days, [] 5 days,  Other duration: ...     STANDARD Series                                          # Substance 2 days 4 days remarks     1 Graham Mix III 10% - -       2 Colophony - -       3  2-Mercaptobenzothiazole  - -       4 Methylisothiazolinone - -       5 Carba Mix - -       6 Thiuram Mix [A] - -       7 Bisphenol A Epoxy Resin - -       8 Q-Amru-Jhzkuanmrbu-Formaldehyde Resin - -       9 Mercapto Mix [A] - -       10 Black Rubber Mix- PPD [B] - -       11 Potassium Dichromate  -  -       12 Balsam of Peru (Myroxylon Pereirae Resin) - -       13 Nickel Sulphate Hexahydrate - -       14 Mixed Dialkyl Thiourea - -       15 Paraben Mix [B] - -       16  Methyldibromo Glutaronitrile - -       17 Fragrance Mix 8% - -       18 2-Bromo-2-Nitropropane-1,3-Diol (Bronopol) CT - -       19 Lyral - -       20 Tixocortol-21- Pivalate CT - -       21 Diazolidinyl urea (Germall II) - -        22 Methyl Methacrylate - -       23 Cobalt (II) Chloride Hexahydrate - -       24 Fragrance Mix II  - -       25 Compositae Mix II - -       26 Benzoyl Peroxide - -       27 Bacitracin - -       28 Formaldehyde - -       29 Methylchloroisothiazolinone / Methylisothiazolinone - -       30 Corticosteroid Mix CT - -       31 Sodium Lauryl Sulfate - -       32 Lanolin Alcohol - -       33 Turpentine - -       34 Cetylstearylalcohol - -       35 Chlorhexidine Dicluconate - -       36 Budesonide - -       37 Imidazolidinyl Urea  - -       38 Ethyl-2 Cyanoacrylate - -       39 Quaternium 15 (Dowicil 200) - -       40 Decyl Glucoside - -     Compositae Mix II - common yarrow, mountain arnica, Khmer chamomile, feverfew, and the common tansy   PRESERVATIVES & ANTIMICROBIALS        # Substance 2 days 4 days remarks   41 1 1,2-Benzisothiazoline-3-One, Sodium Salt - -     2 1,3,5-Maldonado (2-Hydroxyethyl) - Hexahydrotriazine (Grotan BK) - -     3 Dichlorophene - -     4 3, 4, 4' - Triclocarban - -    45 5 4 - Chloro - 3 - Cresol - -     6 4 - Chloro - 4 - Xylenol (PCMX) - -     7 7-Ethylbicyclooxazolidine (Bioban UB1388) - -     8 Benzalkonium Chloride CT - -     9 Benzyl Alcohol - -    50 10 Cetalkonium Chloride - -     11 Cetylpyrimidine Chloride  - -     12 Chloroacetamide - -     13 DMDM Hydantoin - -     14 Glutaraldehyde - -    55 15 Triclosan - -     16 Glyoxal Trimeric Dihydrate - -     17 Iodopropynyl Butylcarbamate - -     18 Octylisothiazoline - -     19 Acetophenone Azine - -    60 20 Bioban P 1487 (Nitrobutyl) Morpholine/(Ethylnitro-Trimethylene) Dimorpholine - -     21 Phenoxyethanol - -     22 Phenyl Salicylate - -     23 Povidone Iodine - -     24 Sodium Benzoate - -    65 25 Sodium  Disulfite - -     26 Sorbic Acid - -     27 Thimerosal - -     28 Melamine Formaldehyde Resin - -     29 Ethylenediamine Dihydrochloride - -      Parabens      70 30 Butyl-P-Hydroxybenzoate - -     31 Ethyl-P-Hydroxybenzoate - -     32 Methyl-P-Hydroxybenzoate - -    73 33 Propyl-P-Hydroxybenzoate - -     EMULSIFIERS & ADDITIVES       # Substance 2 days 4 days remarks   74 1 Polyethylene Glycol-400 - -    75 2 Cocamidopropyl Betaine - -     3 Amerchol L101 - -     4 Propylene Glycol - -     5 Triethanolamine - -     6 Sorbitane Sesquiolate CT - -    80 7 Isopropylmyristate - -     8 Polysorbate 80 CT - -     9 Amidoamine   (Stearamidopropyl Dimethylamine) - -     10 Oleamidopropyl Dimethylamine - -     11 Lauryl Glucoside - -    85 12 Coconut Diethanolamide  - -     13 2-Hydroxy-4-Methoxy Benzophenone (Oxybenzone) - -     14 Benzophenone-4 (Sulisobenzon) - -     15 Propolis - -     16 Dexpanthenol - -    90 17 Abitol (Hydroabietyl Alcohol) - -     18 Tert-Butylhydroquinone - -     19 Benzyl Salicylate - -     20 Dimethylaminopropylamin (DMPA) - -     21 Zinc Pyrithione (Zinc Omadine)  - -    95 22 Maldonado(Hydroxymethyl) Nitromethane  - -      Antioxidant       23 Dodecyl Gallate - -     24 Butylhydroxyanisole (BHA) - -     25 Butylhydroxytoluene (BHT) - -     26 Di-Alpha-Tocopherol (Vit E) - -    100 27 Propyl Gallate - -     PERFUMES, FLAVORS & PLANTS        # Substance 2 days 4 days remarks   101 1 Benzyl Cinnamate - -     2 Di-Limonene (Dipentene) - -     3 Cananga Odorata (Ylang Ylang) (I) - -     4 Lichen Acid Mix - -    105 5 Mentha Piperita Oil (Peppermint Oil) - -     6 Sesquiterpenelactone mix - -     7 Tea Tree Oil, Oxidized - -     8 Wood Tar Mix - -     9 Abietic Acid - -    110 10 Lavendula Angustifolia Oil (Lavender Oil) - -     11 Fragrance mix II CT * 14% - -      Fragrance Mix I       12 Oakmoss Absolute - -     13 Eugenol - -     14 Geraniol - -    115 15 Hydroxycitronellal - -     16 Isoeugenol - -      17 Cinnamic Aldehyde - -     18 Cinnamic Alcohol  - -      Fragrance mix II       19 Citronellol - -    120 20 Alpha-Hexylcinnamic Aldehyde    - -     21 Citral - -     22 Farnesol - -    123 23 Coumarin - -    Hexylcinnamic aldehyde, Coumarin, Farnesol, Hydroxyisohexy3-cyclohexene carboxaldehyde, citral, citrolellol   HAIRDRESSER        # Substance 2 days 4 days remarks   124 1 P-Phenylenediamine -  -    125 2 P-Aminodiphenylamine - -     3 P-Toluenediamine Sulphate  -  -     4 Ammonium Thioglycolate - -     5 Ammonium Persulfate - -     6 Resorcinol - -    130 7 3-Aminophenol - -     8 P-Aminophenol - -     9 Glyceryl Monothioglycolate - -    133 10 Pyrogallol - -     CORTICOSTEROIDS   # Substance 2 days 4 days remarks Allergy  class   134 1 Amcinonide - -  B   135 2 Betametasone-17,21 Dipropionate - -  D1    3 Desoximetasone - -  C    4 Betamethasone-17-Valerate - -  D1    5 Hydrocortisone - -  A    6 Clobetasol-17-Propionate - -  D1   140 7 Dexamethasone-21-Phosphate Disodium Salt - -  C    8 Hydrocortisone-17 Butyrate - -  D2    9 Prednisolone - -  A    10 Triamcinolone Acetonide - -  B    11 Methylprednisolone Aceponate - -  D2   145 12 Hydrocortisone-21-Acetate - -  A   146 13 Prednicarbate - -  D2     Group Characteristics of group Generic name Name  cross reactions   A Hydrocortisone   Cloprednole, Fludrocortisone acétate, Hydrocortison acetate, Methylprednisolone, Prednisolone, Tixocortolpivalate Alfacortone, Fucidin H, Dermacalm, Hexacortone, Premandole, Imacort With group D2   B Triamcinolone-acetonide   Budenoside (R-isomer), Amcinonide, Desonide, Fluocinolone acetonide, Triamcinolone acetonide Locapred, Locatop  Synalar, Pevisone, Ken[place]ort -   C Betamethasone (Without Rosenda)   Betamethasone, Dexamethasone, Flumethasone pivalate, Halomethasone Daivobet, Dexasalyl, Locasalen,   -   D1 Betamethasone-diproprionate   Betamethasone dipropionate, Betamethasone-17-valerate, Clobetasole-propionate,  Fluticasone propionate, Mometasone furoate Betnovate, Diprogenta, Diprosalic, Diprosone, Celestoderm, Fucicort,  Cutivate, Axotide, Elocom -   D2 Methylprednisolone-aceponate   Hydrocortisone-aceponate, Hydrocortisone-buteprate, Hydrocortisone-17-butyrate, Methylprednisolone aceponate, Prednicarbate Locoïd, Advantan,  Prednitop With group A and Budesonide (S-isomer)      Results of patch tests:                         Interpretation:  - Negative                    A    = Allergic      (+) Erythema    TI   = Toxic/irritant   + E + Infiltration    RaP = Relevance at Present     ++ E/I + Papulovesicle   Rpr  = Relevance Previously     +++ E/I/P + Blister     nR   = No Relevance    [] No relevant allergic reaction observed    [] Allergic reaction diagnosed against following allergens:      Interpretation/ remarks:   See later    [] Patient information given   [] ACDS CAMP information's (# ....) to following compounds: .....   [] General information's to following compounds: ......      ____________________________________________    Assessment & Plan:    ==> Final Diagnosis:     # Chronic recurrent hair loss with LPP/FFA  Ruling out underlying contact allergy aggravating conditions  Ruling out underlying atopy aggravation conditions  * chronic illness with exacerbation, progression, side effects from treatment    # Atopic predisposition? with:   Positive FHx  No clear signs for atopy based on history and negative prick tests    These conclusions are made at the best of one's knowledge and belief based on the provided evidence such as patient's history and allergy test results and they can change over time or can be incomplete because of missing information.    ==> Treatment Plan:    >> Will update below treatment plan on Friday after 2nd readings and final conclusions:  >> Until patch tests can be completed, continue treatment regimen set with Dr. Esquivel.      Procedures Performed: Allergy tests, including patch tests      Staff Involved: Provider, Staff, and Scribe    Scribe Disclosure:   I, Adriano Denis, am serving as a scribe to document services personally performed by Walt Mclaughlin MD based on data collection and the provider's statements to me.     Staff Physician Comments:  I was present with the scribe who participated in the documentation of the note. I have verified the history and personally performed the physical exam and medical decision making. I agree with the assessment and plan as documented in the note. I have reviewed and if necessary amended the note.      Walt Mclaughlin MD  Professor  Head of Dermato-Allergy Division  Department of Dermatology  [facility]      Follow-up in Derm-Allergy clinic for 1st readings of patch tests after 2 days  - next scheduled to follow up with Dr. Esquivel on 4/25/25 for another ILK injection   ___________________________    I spent a total of 25 minutes with Maria Ines Coreas during today s  visit. This time was spent discussing all the individual test results, correlating them to the clinical relevance, counseling the patient and/or coordinating care and performing allergy tests or procedures.      Again, thank you for allowing me to participate in the care of your patient.        Sincerely,        Walt Mclaughlin MD    Electronically signed

## 2025-04-21 NOTE — PROGRESS NOTES
[facility] Dermato-allergology Note  Office visit  Encounter Date: Apr 21, 2025  ____________________________________________    CC: Allergy Testing Followup (Patch day 1)      HPI:  (Apr 21, 2025)  Ms. Maria Ines Coreas is a(n) 49 year old female who presents today as a return patient for allergy tests as planned  - Follow-up in Derm-Allergy clinic for patch tests in 4/2025 or 5/2025  Day 3 of patch tests can be virtual  If she follows up with Dr. Esquivel around that time, would recommend she schedule follow up for after day 5 appointment time and then she can do repeat ILK injections if still needed  - Otherwise feeling well in usual state of health    Physical Exam:  General: In no acute distress, well-developed, well-nourished  Eyes: no conjunctivitis  ENT: no signs of rhinitis   Pulmonary: no wheezing or coughing  Skin: Focused examination of the skin on test sites was performed = see test results below  No active eczematous skin lesions on tests sites, particularly back    Earlier History and Allergy Exams:  (Oct 18, 2024)  Presents today as new patient for allergy consultation  - Referred by Dr. Esquivel on 6/14/24 for LPP/FFA with stable hair loss but persistent erythema/scale and pruritus so wanting to rule out allergic contact dermatitis as a component  - Scheduled to follow up with Dr. Esquivel at 09:30  - Has had scalp issues for approaching 10 years  - Frontal scalp is itchy and sometimes scaly and dry  - Has never noticed involvement on any other part of back  - No other skin involvement     Skin: Focused examination of the skin on test sites was performed = see test results below  Focused examination of the scalp was performed.  - On the front scalp, no signs of inflammation or desquamation.   - On the occipital scalp, no signs of erythema or desquamation.    Past Medical History:   There is no problem list on file for this patient.    No past medical history on  file.    Allergies:  No Known Allergies    Medications:  Current Outpatient Medications   Medication Sig Dispense Refill    bimatoprost (LUMIGAN) 0.03 % ophthalmic solution Apply to eyebrows nightly 5 mL 6    COMPOUNDED NON-CONTROLLED SUBSTANCE (CMPD RX) - PHARMACY TO MIX COMPOUNDED MEDICATION Apply to scalp twice daily as needed 10 mL 3    doxycycline monohydrate (MONODOX) 100 MG capsule Take 1 capsule (100 mg) by mouth 2 times daily. 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 30 g 3    hydroxychloroquine (PLAQUENIL) 200 MG tablet Take 1 tablet (200 mg) by mouth 2 times daily 180 tablet 3    ketoconazole (NIZORAL) 2 % external shampoo Apply topically daily as needed for itching or irritation 120 mL 11    minoxidil (LONITEN) 2.5 MG tablet Take 1 tablet by mouth once daily 90 tablet 3    prednisoLONE acetate (PRED FORTE) 1 % ophthalmic suspension INSTILL 1 DROP INTO EACH EYE THREE TIMES A WEEK      tacrolimus (PROTOPIC) 0.1 % external ointment Apply 1-2 times daily to scalp. 60 g 6    valACYclovir (VALTREX) 500 MG tablet Take 500 mg by mouth daily       Current Facility-Administered Medications   Medication Dose Route Frequency Provider Last Rate Last Admin    triamcinolone acetonide (KENALOG-10) injection 10 mg  10 mg Intra-Lesional Once Renee Aparicio MD        triamcinolone acetonide (KENALOG-10) injection 5 mg  5 mg Intra-Lesional Once Renee Aparicio MD         Previous Labs, Allergy Tests, Dermatopathology, Imaging:  [Upon review of Epic chart, no prior allergy records as of 10/18/24.]     6/14/24 Dr. Alana Esquivel (derm) - most recent derm visit in [facility]  DERM PROBLEM LIST:  1. 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 She tried the tacrolimus solution but discontinued due to burning and irritation. Not sure if she notices much difference with the doxycycline  - Continues doxycycline 100 mg BID, hydroxychloroquine 200 mg BID (last eye exam in April), oral minoxidil 2.5 mg daily, finasteride 5 mg daily, Lidex solution, and keto shampoo & T-Sal shampoo (alternates; washes hair daily).   - Does continue to notices hair loss and inflammation. Today is a relatively good day, but sometimes the pruritus can be pretty bad. Also has associated hair loss of eyebrows and eyelashes.   - ILK helps, but she does notice some atrophy. She says her local derm has been doing ILK 5 instead of 10 and that seems to have similar efficacy with less atrophy    FROM A&P:  # FFA/LPP s/p bx 2018  Overall with continued mild-moderate disease activity but fairly stable in comparison to prior photos. Also with ongoing symptoms of intermittent pruritus. Discussed the risks and benefits of further therapy options, including mycophenolate, tofacitinib solution, or switching finasteride to dutasteride. Also reviewed the possibility of allergic contact dermatitis given evidence of persistent inflammation but stable hair loss.  - ILK 10 injections performed today (will plan to go down to ILK 5 in the future). See procedure note below.  - Referral placed for patch testing to r/o ACD component  - Stop doxycycline given  lack of significant response  - Start tofacitinib solution BID as needed (to [place])  - Continue hydroxychloroquine 200 mg BID  - Repeat safety labs for hydroxychloroquine (CBC and CMP); continue annual eye exams with optho (last 4/2024)  We forgot to have pt go down to lab so will contact her and see if she wants to get labs closer to home  - Continue oral minoxidil 2.5 mg daily  - Continue finasteride 5 mg daily  - Continue ketoconazole 2% shampoo 3 times per week, alternating with T-sal shampoo  - Continue fluocinonide solution nightly        Referred By: Alana Esquivel MD (derm)  [address] [place] [address]     Allergy Tests:  Past Allergy Test    Family History:  Family History   Problem Relation Age of Onset    Skin Cancer Father      Dad and now her son (16 y/o) with very dry, cracking hands.  Mom has pollen allergies/hay fever.  Daughter has runny/drippy nose and takes allergy medication regularly.    Social History:  The patient works as an , currently in more of a supervisory position. Patient has the following hobbies or non-occupational exposure: run.  Does not work with paint/art work.    Order for Future Allergy Testing:    [x] Outpatient  [] Inpatient: [facility]..../ Bed ...    Skin Atopy (atopic dermatitis)? [] Yes     [x] No  Comments:   Childhood eczema?   [] Yes     [x] No  Comments:   Hand eczema?   [] Yes     [x] No  If yes, leading hand? [] Right     [] Left     [] Ambidextrous  Comments:     Contact allergies?   [x] Yes     [] No  Comments: adds hi-lites to her hair  Including adhesives/bandages? [] Yes     [x] No  Comments:   Including metals?   [x] Yes     [] No  Comments:   - wears a few rings currently  - used to have more ear piercings, but ears would get red and inflamed, so stopped wearing them    Drug allergies?   [] Yes     [x] No  Comments:     Angioedema?   [] Yes     [x] No  Comments:     Urticaria?   [x] Yes     [] No  Comments: has at some point in her life,  rarely and a handful of time, did not last long and she is unaware of trigger    Food allergies?   [] Yes     [x] No  Comments:     Pet allergies?   [] Yes     [x] No  Comments: has 1 dog and 1 cat; has had dog and/or cat for >20 years, and has never noticed reactions around them    Environmental allergy symptoms?  [] Conjunctivitis  [] Otitis  [] Pharyngitis  [] Polyposis  [] Postnasal Drip  [] Rhinitis  [] Sinusitis  [x] None  Comments: many years ago, only had to take an allergy pill for 1 year when she had watery feeling in ears    HENT Operations?  [] Adenoids [] Septum [] Sinus  [] Tonsils        [x] Other: wisdom teeth extraction  [] None  Comments:     Pulmonary symptoms (from birth to present)?  [] Asthma bronchiale  Inhaler(s)?:   [] Coughing  [] Other  [x] None  Comments:     Environmental and pulmonary symptoms aggravated by?  Season: [x] None     [] I     [] II     [] III     [] IV     [] V     [] VI          [] VII     [] VIII     [] IX     [] X     [] XI     [] XII     [] Perennial  Time of Day: [x] None     [] Morning     [] Noon     [] Evening     [] Night     [] Whole Day  Location/Changes: [x] None     [] Inside     [] Outside     [] Mountain     [] Sea     [] Other:   Triggers (specific): [x] None     [] Animals     [] Dust     [] Mold     [] Plants     [] Other:   Triggers (other): [x] None     [] Psyche     [] Sport     [] Work     [] Other:   Irritant: [x] None     [] Cold     [] Heat     [] Odors     [] Physical Efforts     [] Smoke     [] Other:     Order for PATCH TESTS  Reason for tests (suspected allergy): ruling out ACD component of chronic, recurrent, therapy-resistant LPP/FFA  Known previous allergies: none  Standardized panels  [x] Standard panel (40 tests)  [x] Preservatives & Antimicrobials (31 tests)  [x] Emulsifiers & Additives (25 tests)   [x] Perfumes/Flavours & Plants (25 tests)  [x] Hairdresser panel (12 tests)  [] Rubber Chemicals (22 tests)  [] Plastics (26 tests)  []  Colorants/Dyes/Food additives (20 tests)  [] Metals (implants/dental) (24 tests)  [] Local anaesthetics/NSAIDs (13 tests)  [] Antibiotics & Antimycotics (14 tests)   [x] Corticosteroids (15 tests)   [] Photopatch test (62 tests)   [] Others:     [] Patient's Own Products:   DO NOT test if chemical or biological identity is unknown!   always ask from patient the product information and safety sheets (MSDS)       Order for PRICK TESTS    Reason for tests (suspected allergy): atopy? No clear signs in history other than positive FHx  Known previous allergies: none    Standardized prick panels  [x] Atopic panel (20 tests)  [] Pediatric Panel (12 tests)  [] Milk, Meat, Eggs, Grains (20 tests)   [] Dust, Epithelia, Feathers (10 tests)  [] Fish, Seafood, Shellfish (17 tests)  [] Nuts, Beans (14 tests)  [] Spice, Vegetable, Fruit (17 tests)  [] Pollen Panel = Tree, Grass, Weed (24 tests)  [] Others:     [] Patient's Own Products:   DO NOT test if chemical or biological identity is unknown!   always ask from patient the product information and safety sheets (MSDS)     Standardized intradermal tests  [] Alternaria alternata  [] Aspergillus fumigatus  [] Cladosporium herbarum   [] Penicillium notatum  [] Dermatophagoides farinae  [] Dermatophagoides pteronyssinus  [] Dog Epithelium  [] Cat Epithelium  [] Others:     [] Bee venom   [] Wasp venom  !!Specific protocol with dilutions!!       Order for Drug allergy tests (prick & intradermal & patch tests)    [] Penicillin G     [] Ampicillin   [] Cefazolin        [] Ceftriaxone     [] Ceftazidime     [] Cefepime     [] Cefuroxime  [] Bactrim  [] Iodixanol     [] Iopamidol        [] Iohexol  [] Others:   [] Patient's Own:   Order for N/A as test date      Atopy Screen (Placed Oct 18, 2024)  No Substance Readings (15 min) Evaluation   POS Histamine 1mg/ml ++    NEG NaCl 0.9% -      No Substance Readings (15 min) Evaluation   1 Alternaria alternata (tenuis)  -    2 Cladosporium  herbarum -    3 Aspergillus fumigatus -    4 Penicillium notatum -    5 Dermatophagoides pteronyssinus -    6 Dermatophagoides farinae -    7 Dog epithelium (canis spp) -    8 Cat hair (phi catus) -    9 Cockroach   (Blatella americana & germanica) -    10 Grass mix midwest   (Chantal, Orchard, Redtop, Patel) -    11 Gaurang grass (sorghum halepense) -    12 Weed mix   (common Cocklebur, Lamb s quarters, rough redroot Pigweed, Dock/Sorrel) -    13 Mug wort (artemisia vulgare) -    14 Ragweed giant/short (ambrosia spp) -    15 White birch (Betula papyrifera) -    16 Tree mix 1 (Pecan, Maple BHR, Oak RVW, american [place], black [place]) -    17 Red cedar (juniperus virginia) -    18 Tree mix 2   (white [place], river/red Birch, black [place], common [place], american Elm) -    19 Box elder/Maple mix (acer spp) -    20 Addison shagbark (carya ovata) -    Conclusion:     RESULTS & EVALUATION of PATCH TESTS    Apr 21, 2025 application of patch tests:    Patch test readings after     [x] 2 days, [] 3 days [x] 4 days, [] 5 days,  Other duration: ...     STANDARD Series                                          # Substance 2 days 4 days remarks     1 Graham Mix III 10% - -       2 Colophony - -       3  2-Mercaptobenzothiazole  - -       4 Methylisothiazolinone - -       5 Carba Mix - -       6 Thiuram Mix [A] - -       7 Bisphenol A Epoxy Resin - -       8 U-Hrkz-Tumyfhlypwa-Formaldehyde Resin - -       9 Mercapto Mix [A] - -       10 Black Rubber Mix- PPD [B] - -       11 Potassium Dichromate  -  -       12 Balsam of [place] (Myroxylon Pereirae Resin) - -       13 Nickel Sulphate Hexahydrate - -       14 Mixed Dialkyl Thiourea - -       15 Paraben Mix [B] - -       16 Methyldibromo Glutaronitrile - -       17 Fragrance Mix 8% - -       18 2-Bromo-2-Nitropropane-1,3-Diol (Bronopol) CT - -       19 Lyral - -       20 Tixocortol-21- Pivalate CT - -       21 Diazolidinyl urea (Germall II) - -        22 Methyl Methacrylate - -        23 Cobalt (II) Chloride Hexahydrate - -       24 Fragrance Mix II  - -       25 Compositae Mix II - -       26 Benzoyl Peroxide - -       27 Bacitracin - -       28 Formaldehyde - -       29 Methylchloroisothiazolinone / Methylisothiazolinone - -       30 Corticosteroid Mix CT - -       31 Sodium Lauryl Sulfate - -       32 Lanolin Alcohol - -       33 Turpentine - -       34 Cetylstearylalcohol - -       35 Chlorhexidine Dicluconate - -       36 Budesonide - -       37 Imidazolidinyl Urea  - -       38 Ethyl-2 Cyanoacrylate - -       39 Quaternium 15 (Dowicil 200) - -       40 Decyl Glucoside - -     Compositae Mix II - common yarrow, mountain arnica, Czech chamomile, feverfew, and the common tansy   PRESERVATIVES & ANTIMICROBIALS        # Substance 2 days 4 days remarks   41 1 1,2-Benzisothiazoline-3-One, Sodium Salt - -     2 1,3,5-Maldonado (2-Hydroxyethyl) - Hexahydrotriazine (Grotan BK) - -     3 Dichlorophene - -     4 3, 4, 4' - Triclocarban - -    45 5 4 - Chloro - 3 - Cresol - -     6 4 - Chloro - 4 - Xylenol (PCMX) - -     7 7-Ethylbicyclooxazolidine (Bioban RL4625) - -     8 Benzalkonium Chloride CT - -     9 Benzyl Alcohol - -    50 10 Cetalkonium Chloride - -     11 Cetylpyrimidine Chloride  - -     12 Chloroacetamide - -     13 DMDM Hydantoin - -     14 Glutaraldehyde - -    55 15 Triclosan - -     16 Glyoxal Trimeric Dihydrate - -     17 Iodopropynyl Butylcarbamate - -     18 Octylisothiazoline - -     19 Acetophenone Azine - -    60 20 Bioban P 1487 (Nitrobutyl) Morpholine/(Ethylnitro-Trimethylene) Dimorpholine - -     21 Phenoxyethanol - -     22 Phenyl Salicylate - -     23 Povidone Iodine - -     24 Sodium Benzoate - -    65 25 Sodium Disulfite - -     26 Sorbic Acid - -     27 Thimerosal - -     28 Melamine Formaldehyde Resin - -     29 Ethylenediamine Dihydrochloride - -      Parabens      70 30 Butyl-P-Hydroxybenzoate - -     31 Ethyl-P-Hydroxybenzoate - -     32 Methyl-P-Hydroxybenzoate - -     73 33 Propyl-P-Hydroxybenzoate - -     EMULSIFIERS & ADDITIVES       # Substance 2 days 4 days remarks   74 1 Polyethylene Glycol-400 - -    75 2 Cocamidopropyl Betaine - -     3 Amerchol L101 - -     4 Propylene Glycol - -     5 Triethanolamine - -     6 Sorbitane Sesquiolate CT - -    80 7 Isopropylmyristate - -     8 Polysorbate 80 CT - -     9 Amidoamine   (Stearamidopropyl Dimethylamine) - -     10 Oleamidopropyl Dimethylamine - -     11 Lauryl Glucoside - -    85 12 Coconut Diethanolamide  - -     13 2-Hydroxy-4-Methoxy Benzophenone (Oxybenzone) - -     14 Benzophenone-4 (Sulisobenzon) - -     15 Propolis - -     16 Dexpanthenol - -    90 17 Abitol (Hydroabietyl Alcohol) - -     18 Tert-Butylhydroquinone - -     19 Benzyl Salicylate - -     20 Dimethylaminopropylamin (DMPA) - -     21 Zinc Pyrithione (Zinc Omadine)  - -    95 22 Maldonado(Hydroxymethyl) Nitromethane  - -      Antioxidant       23 Dodecyl Gallate - -     24 Butylhydroxyanisole (BHA) - -     25 Butylhydroxytoluene (BHT) - -     26 Di-Alpha-Tocopherol (Vit E) - -    100 27 Propyl Gallate - -     PERFUMES, FLAVORS & PLANTS        # Substance 2 days 4 days remarks   101 1 Benzyl Cinnamate - -     2 Di-Limonene (Dipentene) - -     3 Cananga Odorata (Sesar Kaba) (I) - -     4 Lichen Acid Mix - -    105 5 Mentha Piperita Oil (Peppermint Oil) - -     6 Sesquiterpenelactone mix - -     7 Tea Tree Oil, Oxidized - -     8 Wood Tar Mix - -     9 Abietic Acid - -    110 10 Lavendula Angustifolia Oil (Lavender Oil) - -     11 Fragrance mix II CT * 14% - -      Fragrance Mix I       12 Oakmoss Absolute - -     13 Eugenol - -     14 Geraniol - -    115 15 Hydroxycitronellal - -     16 Isoeugenol - -     17 Cinnamic Aldehyde - -     18 Cinnamic Alcohol  - -      Fragrance mix II       19 Citronellol - -    120 20 Alpha-Hexylcinnamic Aldehyde    - -     21 Citral - -     22 Farnesol - -    123 23 Coumarin - -    Hexylcinnamic aldehyde, Coumarin, Farnesol,  Hydroxyisohexy3-cyclohexene carboxaldehyde, citral, citrolellol   HAIRDRESSER        # Substance 2 days 4 days remarks   124 1 P-Phenylenediamine -  -    125 2 P-Aminodiphenylamine - -     3 P-Toluenediamine Sulphate  -  -     4 Ammonium Thioglycolate - -     5 Ammonium Persulfate - -     6 Resorcinol - -    130 7 3-Aminophenol - -     8 P-Aminophenol - -     9 Glyceryl Monothioglycolate - -    133 10 Pyrogallol - -     CORTICOSTEROIDS   # Substance 2 days 4 days remarks Allergy  class   134 1 Amcinonide - -  B   135 2 Betametasone-17,21 Dipropionate - -  D1    3 Desoximetasone - -  C    4 Betamethasone-17-Valerate - -  D1    5 Hydrocortisone - -  A    6 Clobetasol-17-Propionate - -  D1   140 7 Dexamethasone-21-Phosphate Disodium Salt - -  C    8 Hydrocortisone-17 Butyrate - -  D2    9 Prednisolone - -  A    10 Triamcinolone Acetonide - -  B    11 Methylprednisolone Aceponate - -  D2   145 12 Hydrocortisone-21-Acetate - -  A   146 13 Prednicarbate - -  D2     Group Characteristics of group Generic name Name  cross reactions   A Hydrocortisone   Cloprednole, Fludrocortisone acétate, Hydrocortison acetate, Methylprednisolone, Prednisolone, Tixocortolpivalate Alfacortone, Fucidin H, Dermacalm, Hexacortone, Premandole, Imacort With group D2   B Triamcinolone-acetonide   Budenoside (R-isomer), Amcinonide, Desonide, Fluocinolone acetonide, Triamcinolone acetonide Locapred, Locatop  Synalar, Pevisone, [place]ort -   C Betamethasone (Without Rosenda)   Betamethasone, Dexamethasone, Flumethasone pivalate, Halomethasone Daivobet, Dexasalyl, Locasalen,   -   D1 Betamethasone-diproprionate   Betamethasone dipropionate, Betamethasone-17-valerate, Clobetasole-propionate, Fluticasone propionate, Mometasone furoate Betnovate, Diprogenta, Diprosalic, Diprosone, Celestoderm, Fucicort,  Cutivate, Axotide, Elocom -   D2 Methylprednisolone-aceponate   Hydrocortisone-aceponate, Hydrocortisone-buteprate, Hydrocortisone-17-butyrate,  Methylprednisolone aceponate, Prednicarbate Locoïd, Advantan,  Prednitop With group A and Budesonide (S-isomer)      Results of patch tests:                         Interpretation:  - Negative                    A    = Allergic      (+) Erythema    TI   = Toxic/irritant   + E + Infiltration    RaP = Relevance at Present     ++ E/I + Papulovesicle   Rpr  = Relevance Previously     +++ E/I/P + Blister     nR   = No Relevance    [] No relevant allergic reaction observed    [] Allergic reaction diagnosed against following allergens:      Interpretation/ remarks:   See later    [] Patient information given   [] ACDS CAMP information's (# ....) to following compounds: .....   [] General information's to following compounds: ......      ____________________________________________    Assessment & Plan:    ==> Final Diagnosis:     # Chronic recurrent hair loss with LPP/FFA  Ruling out underlying contact allergy aggravating conditions  Ruling out underlying atopy aggravation conditions  * chronic illness with exacerbation, progression, side effects from treatment    # Atopic predisposition? with:   Positive FHx  No clear signs for atopy based on history and negative prick tests    These conclusions are made at the best of one's knowledge and belief based on the provided evidence such as patient's history and allergy test results and they can change over time or can be incomplete because of missing information.    ==> Treatment Plan:    >> Will update below treatment plan on Friday after 2nd readings and final conclusions:  >> Until patch tests can be completed, continue treatment regimen set with Dr. Esquivel.      Procedures Performed: Allergy tests, including patch tests     Staff Involved: Provider, Staff, and Scribe    Scribe Disclosure:   I, Adriano Denis, am serving as a scribe to document services personally performed by Walt Mclaughlin MD based on data collection and the provider's statements to me.     Staff  Physician Comments:  I was present with the scribe who participated in the documentation of the note. I have verified the history and personally performed the physical exam and medical decision making. I agree with the assessment and plan as documented in the note. I have reviewed and if necessary amended the note.      Walt Mclaughlin MD  Professor  Head of Dermato-Allergy Division  Department of Dermatology  [facility]      Follow-up in Derm-Allergy clinic for 1st readings of patch tests after 2 days  - next scheduled to follow up with Dr. Esqiuvel on 4/25/25 for another ILK injection   ___________________________    I spent a total of 25 minutes with Maria Ines Coreas during today s  visit. This time was spent discussing all the individual test results, correlating them to the clinical relevance, counseling the patient and/or coordinating care and performing allergy tests or procedures.

## 2025-04-23 ENCOUNTER — OFFICE VISIT (OUTPATIENT)
Dept: ALLERGY | Facility: CLINIC | Age: 50
End: 2025-04-23
Payer: COMMERCIAL

## 2025-04-23 DIAGNOSIS — L20.89 OTHER ATOPIC DERMATITIS: ICD-10-CM

## 2025-04-23 DIAGNOSIS — L66.10 LICHEN PLANOPILARIS: Primary | ICD-10-CM

## 2025-04-23 DIAGNOSIS — L23.5 ALLERGIC DERMATITIS DUE TO OTHER CHEMICAL PRODUCT: ICD-10-CM

## 2025-04-23 DIAGNOSIS — Z88.9 ATOPY: ICD-10-CM

## 2025-04-23 DIAGNOSIS — L66.12 FRONTAL FIBROSING ALOPECIA: ICD-10-CM

## 2025-04-23 PROCEDURE — 99207 PR NO CHARGE LOS: CPT | Performed by: DERMATOLOGY

## 2025-04-23 NOTE — LETTER
4/23/2025      Maria Ines Coreas  [address] [place] [address]      Dear Colleague,    Thank you for referring your patient, Maria Ines Coreas, to the [facility]. Please see a copy of my visit note below.    [facility] Dermato-[facility]ergology Note  Office visit  Encounter Date: Apr 23, 2025  ____________________________________________    CC: Allergy Testing Followup (Patch day 3)    HPI:  (Apr 23, 2025)  Ms. Maria Ines Coreas is a(n) 49 year old female who presents today as a return patient for allergy tests as planned  - Follow-up in Derm-Allergy clinic for 1st readings of patch tests after 2 days  - Otherwise feeling well in usual state of health    Physical Exam:  General: In no acute distress, well-developed, well-nourished  Eyes: no conjunctivitis  ENT: no signs of rhinitis   Pulmonary: no wheezing or coughing  Skin: Focused examination of the skin on test sites was performed = see test results below  No active eczematous skin lesions on tests sites, particularly back    Earlier History and Allergy Exams:  (Apr 21, 2025)  Presents today as a return patient for allergy tests as planned  - Follow-up in Derm-Allergy clinic for patch tests in 4/2025 or 5/2025  Day 3 of patch tests can be virtual  If she follows up with Dr. Esquivel around that time, would recommend she schedule follow up for after day 5 appointment time and then she can do repeat ILK injections if still needed     (Oct 18, 2024)  Presents today as new patient for allergy consultation  - Referred by Dr. Esquivel on 6/14/24 for LPP/FFA with stable hair loss but persistent erythema/scale and pruritus so wanting to rule out allergic contact dermatitis as a component  - Scheduled to follow up with Dr. Esquivel at 09:30  - Has had scalp issues for approaching 10 years  - Frontal scalp is itchy and sometimes scaly and dry  - Has never noticed involvement on any other part of back  - No  other skin involvement     Skin: Focused examination of the skin on test sites was performed = see test results below  Focused examination of the scalp was performed.  - On the front scalp, no signs of inflammation or desquamation.   - On the occipital scalp, no signs of erythema or desquamation.    Past Medical History:   There is no problem list on file for this patient.    No past medical history on file.    Allergies:  No Known Allergies    Medications:  Current Outpatient Medications   Medication Sig Dispense Refill     bimatoprost (LUMIGAN) 0.03 % ophthalmic solution Apply to eyebrows nightly 5 mL 6     COMPOUNDED NON-CONTROLLED SUBSTANCE (CMPD RX) - PHARMACY TO MIX COMPOUNDED MEDICATION Apply to scalp twice daily as needed 10 mL 3     doxycycline monohydrate (MONODOX) 100 MG capsule Take 1 capsule (100 mg) by mouth 2 times daily. 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 30 g 3     hydroxychloroquine (PLAQUENIL) 200 MG tablet Take 1 tablet (200 mg) by mouth 2 times daily 180 tablet 3     ketoconazole (NIZORAL) 2 % external shampoo Apply topically daily as needed for itching or irritation 120 mL 11     minoxidil (LONITEN) 2.5 MG tablet Take 1 tablet by mouth once daily 90 tablet 3     prednisoLONE acetate (PRED FORTE) 1 % ophthalmic suspension INSTILL 1 DROP INTO EACH EYE THREE TIMES A WEEK       tacrolimus (PROTOPIC) 0.1 % external ointment Apply 1-2 times daily to scalp. 60 g 6     valACYclovir (VALTREX) 500 MG tablet Take 500 mg by mouth daily       Current Facility-Administered Medications   Medication Dose Route Frequency Provider Last Rate Last Admin     triamcinolone acetonide (KENALOG-10) injection 10 mg  10 mg Intra-Lesional Once Renee Aparicio MD          triamcinolone acetonide (KENALOG-10) injection 5 mg  5 mg Intra-Lesional Once Renee Aparicio MD         Previous Labs, Allergy Tests, Dermatopathology, Imaging:  [Upon review of Epic chart, no prior allergy records as of 10/18/24.]     6/14/24 Dr. Alana Esquivel (derm) - most recent derm visit in [facility]  DERM PROBLEM LIST:  1. 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 She tried the tacrolimus solution but discontinued due to burning and irritation. Not sure if she notices much difference with the doxycycline  - Continues doxycycline 100 mg BID, hydroxychloroquine 200 mg BID (last eye exam in April), oral minoxidil 2.5 mg daily, finasteride 5 mg daily, Lidex solution, and keto shampoo & T-Sal shampoo (alternates; washes hair daily).   - Does continue to notices hair loss and inflammation. Today is a relatively good day, but sometimes the pruritus can be pretty bad. Also has associated hair loss of eyebrows and eyelashes.   - ILK helps, but she does notice some atrophy. She says her local derm has been doing ILK 5 instead of 10 and that seems to have similar efficacy with less atrophy    FROM A&P:  # FFA/LPP s/p bx 2018  Overall with continued mild-moderate disease activity but fairly stable in comparison to prior photos. Also with ongoing symptoms of intermittent pruritus. Discussed the risks and benefits of further therapy  options, including mycophenolate, tofacitinib solution, or switching finasteride to dutasteride. Also reviewed the possibility of allergic contact dermatitis given evidence of persistent inflammation but stable hair loss.  - ILK 10 injections performed today (will plan to go down to ILK 5 in the future). See procedure note below.  - Referral placed for patch testing to r/o ACD component  - Stop doxycycline given lack of significant response  - Start tofacitinib solution BID as needed (to [place])  - Continue hydroxychloroquine 200 mg BID  - Repeat safety labs for hydroxychloroquine (CBC and CMP); continue annual eye exams with optho (last 4/2024)  We forgot to have pt go down to lab so will contact her and see if she wants to get labs closer to home  - Continue oral minoxidil 2.5 mg daily  - Continue finasteride 5 mg daily  - Continue ketoconazole 2% shampoo 3 times per week, alternating with T-sal shampoo  - Continue fluocinonide solution nightly        Referred By: Alana Esquivel MD (derm)  [address] [place] [address]     Allergy Tests:  Past Allergy Test    Family History:  Family History   Problem Relation Age of Onset     Skin Cancer Father      Dad and now her son (18 y/o) with very dry, cracking hands.  Mom has pollen allergies/hay fever.  Daughter has runny/drippy nose and takes allergy medication regularly.    Social History:  The patient works as an , currently in more of a supervisory position. Patient has the following hobbies or non-occupational exposure: run.  Does not work with paint/art work.    Order for Future Allergy Testing:    [x] Outpatient  [] Inpatient: [facility]..../ Bed ...    Skin Atopy (atopic dermatitis)? [] Yes     [x] No  Comments:   Childhood eczema?   [] Yes     [x] No  Comments:   Hand eczema?   [] Yes     [x] No  If yes, leading hand? [] Right     [] Left     [] Ambidextrous  Comments:     Contact allergies?   [x] Yes     [] No  Comments: adds hi-lites to her  hair  Including adhesives/bandages? [] Yes     [x] No  Comments:   Including metals?   [x] Yes     [] No  Comments:   - wears a few rings currently  - used to have more ear piercings, but ears would get red and inflamed, so stopped wearing them    Drug allergies?   [] Yes     [x] No  Comments:     Angioedema?   [] Yes     [x] No  Comments:     Urticaria?   [x] Yes     [] No  Comments: has at some point in her life, rarely and a handful of time, did not last long and she is unaware of trigger    Food allergies?   [] Yes     [x] No  Comments:     Pet allergies?   [] Yes     [x] No  Comments: has 1 dog and 1 cat; has had dog and/or cat for >20 years, and has never noticed reactions around them    Environmental allergy symptoms?  [] Conjunctivitis  [] Otitis  [] Pharyngitis  [] Polyposis  [] Postnasal Drip  [] Rhinitis  [] Sinusitis  [x] None  Comments: many years ago, only had to take an allergy pill for 1 year when she had watery feeling in ears    HENT Operations?  [] Adenoids [] Septum [] Sinus  [] Tonsils        [x] Other: wisdom teeth extraction  [] None  Comments:     Pulmonary symptoms (from birth to present)?  [] Asthma bronchiale  Inhaler(s)?:   [] Coughing  [] Other  [x] None  Comments:     Environmental and pulmonary symptoms aggravated by?  Season: [x] None     [] I     [] II     [] III     [] IV     [] V     [] VI          [] VII     [] VIII     [] IX     [] X     [] XI     [] XII     [] Perennial  Time of Day: [x] None     [] Morning     [] Noon     [] Evening     [] Night     [] Whole Day  Location/Changes: [x] None     [] Inside     [] Outside     [] Mountain     [] Sea     [] Other:   Triggers (specific): [x] None     [] Animals     [] Dust     [] Mold     [] Plants     [] Other:   Triggers (other): [x] None     [] Psyche     [] Sport     [] Work     [] Other:   Irritant: [x] None     [] Cold     [] Heat     [] Odors     [] Physical Efforts     [] Smoke     [] Other:     Order for PATCH TESTS  Reason  for tests (suspected allergy): ruling out ACD component of chronic, recurrent, therapy-resistant LPP/FFA  Known previous allergies: none  Standardized panels  [x] Standard panel (40 tests)  [x] Preservatives & Antimicrobials (31 tests)  [x] Emulsifiers & Additives (25 tests)   [x] Perfumes/Flavours & Plants (25 tests)  [x] Hairdresser panel (12 tests)  [] Rubber Chemicals (22 tests)  [] Plastics (26 tests)  [] Colorants/Dyes/Food additives (20 tests)  [] Metals (implants/dental) (24 tests)  [] Local anaesthetics/NSAIDs (13 tests)  [] Antibiotics & Antimycotics (14 tests)   [x] Corticosteroids (15 tests)   [] Photopatch test (62 tests)   [] Others:     [] Patient's Own Products:   DO NOT test if chemical or biological identity is unknown!   always ask from patient the product information and safety sheets (MSDS)       Order for PRICK TESTS    Reason for tests (suspected allergy): atopy? No clear signs in history other than positive FHx  Known previous allergies: none    Standardized prick panels  [x] Atopic panel (20 tests)  [] Pediatric Panel (12 tests)  [] Milk, Meat, Eggs, Grains (20 tests)   [] Dust, Epithelia, Feathers (10 tests)  [] Fish, Seafood, Shellfish (17 tests)  [] Nuts, Beans (14 tests)  [] Spice, Vegetable, Fruit (17 tests)  [] Pollen Panel = Tree, Grass, Weed (24 tests)  [] Others:     [] Patient's Own Products:   DO NOT test if chemical or biological identity is unknown!   always ask from patient the product information and safety sheets (MSDS)     Standardized intradermal tests  [] Alternaria alternata  [] Aspergillus fumigatus  [] Cladosporium herbarum   [] Penicillium notatum  [] Dermatophagoides farinae  [] Dermatophagoides pteronyssinus  [] Dog Epithelium  [] Cat Epithelium  [] Others:     [] Bee venom   [] Wasp venom  !!Specific protocol with dilutions!!       Order for Drug allergy tests (prick & intradermal & patch tests)    [] Penicillin G     [] Ampicillin   [] Cefazolin        []  Ceftriaxone     [] Ceftazidime     [] Cefepime     [] Cefuroxime  [] Bactrim  [] Iodixanol     [] Iopamidol        [] Iohexol  [] Others:   [] Patient's Own:   Order for N/A as test date      Atopy Screen (Placed Oct 18, 2024)  No Substance Readings (15 min) Evaluation   POS Histamine 1mg/ml ++    NEG NaCl 0.9% -      No Substance Readings (15 min) Evaluation   1 Alternaria alternata (tenuis)  -    2 Cladosporium herbarum -    3 Aspergillus fumigatus -    4 Penicillium notatum -    5 Dermatophagoides pteronyssinus -    6 Dermatophagoides farinae -    7 Dog epithelium (canis spp) -    8 Cat hair (phi catus) -    9 Cockroach   (Blatella americana & germanica) -    10 Grass mix midwest   (Chantal, Orchard, Redtop, Patel) -    11 Gaurang grass (sorghum halepense) -    12 Weed mix   (common Cocklebur, Lamb s quarters, rough redroot Pigweed, Dock/Sorrel) -    13 Mug wort (artemisia vulgare) -    14 Ragweed giant/short (ambrosia spp) -    15 White birch (Betula papyrifera) -    16 Tree mix 1 (Pecan, Maple BHR, Oak RVW, american [place], black [place]) -    17 Red cedar (juniperus virginia) -    18 Tree mix 2   (white [place], river/red Birch, black [place], common [place], american Elm) -    19 Box elder/Maple mix (acer spp) -    20 LaMoure shagbark (carya ovata) -    Conclusion:     RESULTS & EVALUATION of PATCH TESTS    Apr 21, 2025 application of patch tests:    Patch test readings after     [x] 2 days, [] 3 days [x] 4 days, [] 5 days,  Other duration: ...     STANDARD Series                                          # Substance 2 days 4 days remarks     1 Graham Mix III 10% - -       2 Colophony - -       3  2-Mercaptobenzothiazole  - -       4 Methylisothiazolinone - -       5 Carba Mix - -       6 Thiuram Mix [A] - -       7 Bisphenol A Epoxy Resin - -       8 B-Osgy-Wuiynfjpayb-Formaldehyde Resin - -       9 Mercapto Mix [A] - -       10 Black Rubber Mix- PPD [B] - -       11 Potassium Dichromate  -  -       12 Balsam  of [place] (Myroxylon Pereirae Resin) ? -       13 Nickel Sulphate Hexahydrate - -       14 Mixed Dialkyl Thiourea - -       15 Paraben Mix [B] - -       16 Methyldibromo Glutaronitrile - -       17 Fragrance Mix 8% - -       18 2-Bromo-2-Nitropropane-1,3-Diol (Bronopol) CT - -       19 Lyral - -       20 Tixocortol-21- Pivalate CT - -       21 Diazolidinyl urea (Germall II) - -        22 Methyl Methacrylate - -       23 Cobalt (II) Chloride Hexahydrate - -       24 Fragrance Mix II  - -       25 Compositae Mix II - -       26 Benzoyl Peroxide - -       27 Bacitracin - -       28 Formaldehyde - -       29 Methylchloroisothiazolinone / Methylisothiazolinone - -       30 Corticosteroid Mix CT - -       31 Sodium Lauryl Sulfate - -       32 Lanolin Alcohol - -       33 Turpentine - -       34 Cetylstearylalcohol - -       35 Chlorhexidine Dicluconate - -       36 Budesonide - -       37 Imidazolidinyl Urea  - -       38 Ethyl-2 Cyanoacrylate - -       39 Quaternium 15 (Dowicil 200) - -       40 Decyl Glucoside - -     Compositae Mix II - common yarrow, mountain arnica, Hong Konger chamomile, feverfew, and the common tansy   PRESERVATIVES & ANTIMICROBIALS        # Substance 2 days 4 days remarks   41 1 1,2-Benzisothiazoline-3-One, Sodium Salt - -     2 1,3,5-Maldonado (2-Hydroxyethyl) - Hexahydrotriazine (Grotan BK) - -     3 Dichlorophene - -     4 3, 4, 4' - Triclocarban - -    45 5 4 - Chloro - 3 - Cresol - -     6 4 - Chloro - 4 - Xylenol (PCMX) - -     7 7-Ethylbicyclooxazolidine (Bioban NC4728) - -     8 Benzalkonium Chloride CT - -     9 Benzyl Alcohol - -    50 10 Cetalkonium Chloride - -     11 Cetylpyrimidine Chloride  - -     12 Chloroacetamide - -     13 DMDM Hydantoin - -     14 Glutaraldehyde - -    55 15 Triclosan - -     16 Glyoxal Trimeric Dihydrate - -     17 Iodopropynyl Butylcarbamate - -     18 Octylisothiazoline - -     19 Acetophenone Azine - -    60 20 Shakira P 6555 (Nitrobutyl)  Morpholine/(Ethylnitro-Trimethylene) Dimorpholine - -     21 Phenoxyethanol - -     22 Phenyl Salicylate - -     23 Povidone Iodine - -     24 Sodium Benzoate - -    65 25 Sodium Disulfite - -     26 Sorbic Acid - -     27 Thimerosal - -     28 Melamine Formaldehyde Resin - -     29 Ethylenediamine Dihydrochloride - -      Parabens      70 30 Butyl-P-Hydroxybenzoate - -     31 Ethyl-P-Hydroxybenzoate - -     32 Methyl-P-Hydroxybenzoate - -    73 33 Propyl-P-Hydroxybenzoate - -     EMULSIFIERS & ADDITIVES       # Substance 2 days 4 days remarks   74 1 Polyethylene Glycol-400 - -    75 2 Cocamidopropyl Betaine - -     3 Amerchol L101 - -     4 Propylene Glycol - -     5 Triethanolamine - -     6 Sorbitane Sesquiolate CT - -    80 7 Isopropylmyristate - -     8 Polysorbate 80 CT - -     9 Amidoamine   (Stearamidopropyl Dimethylamine) - -     10 Oleamidopropyl Dimethylamine - -     11 Lauryl Glucoside - -    85 12 Coconut Diethanolamide  - -     13 2-Hydroxy-4-Methoxy Benzophenone (Oxybenzone) - -     14 Benzophenone-4 (Sulisobenzon) - -     15 Propolis - -     16 Dexpanthenol - -    90 17 Abitol (Hydroabietyl Alcohol) - -     18 Tert-Butylhydroquinone - -     19 Benzyl Salicylate - -     20 Dimethylaminopropylamin (DMPA) - -     21 Zinc Pyrithione (Zinc Omadine)  - -    95 22 Maldonado(Hydroxymethyl) Nitromethane  - -      Antioxidant       23 Dodecyl Gallate - -     24 Butylhydroxyanisole (BHA) - -     25 Butylhydroxytoluene (BHT) - -     26 Di-Alpha-Tocopherol (Vit E) - -    100 27 Propyl Gallate - -     PERFUMES, FLAVORS & PLANTS        # Substance 2 days 4 days remarks   101 1 Benzyl Cinnamate - -     2 Di-Limonene (Dipentene) - -     3 Cananga Odorata (Ylang Ylang) (I) - -     4 Lichen Acid Mix - -    105 5 Mentha Piperita Oil (Peppermint Oil) - -     6 Sesquiterpenelactone mix - -     7 Tea Tree Oil, Oxidized - -     8 Wood Tar Mix - -     9 Abietic Acid - -    110 10 Lavendula Angustifolia Oil (Lavender Oil) - -      11 Fragrance mix II CT * 14% - -      Fragrance Mix I       12 Oakmoss Absolute - -     13 Eugenol - -     14 Geraniol - -    115 15 Hydroxycitronellal - -     16 Isoeugenol - -     17 Cinnamic Aldehyde - -     18 Cinnamic Alcohol  - -      Fragrance mix II       19 Citronellol - -    120 20 Alpha-Hexylcinnamic Aldehyde    - -     21 Citral - -     22 Farnesol - -    123 23 Coumarin - -    Hexylcinnamic aldehyde, Coumarin, Farnesol, Hydroxyisohexy3-cyclohexene carboxaldehyde, citral, citrolellol   HAIRDRESSER        # Substance 2 days 4 days remarks   124 1 P-Phenylenediamine -  -    125 2 P-Aminodiphenylamine - -     3 P-Toluenediamine Sulphate  -  -     4 Ammonium Thioglycolate - -     5 Ammonium Persulfate - -     6 Resorcinol - -    130 7 3-Aminophenol - -     8 P-Aminophenol - -     9 Glyceryl Monothioglycolate - -    133 10 Pyrogallol - -     CORTICOSTEROIDS   # Substance 2 days 4 days remarks Allergy  class   134 1 Amcinonide - -  B   135 2 Betametasone-17,21 Dipropionate - -  D1    3 Desoximetasone - -  C    4 Betamethasone-17-Valerate - -  D1    5 Hydrocortisone - -  A    6 Clobetasol-17-Propionate - -  D1   140 7 Dexamethasone-21-Phosphate Disodium Salt - -  C    8 Hydrocortisone-17 Butyrate - -  D2    9 Prednisolone - -  A    10 Triamcinolone Acetonide - -  B    11 Methylprednisolone Aceponate - -  D2   145 12 Hydrocortisone-21-Acetate - -  A   146 13 Prednicarbate - -  D2     Group Characteristics of group Generic name Name  cross reactions   A Hydrocortisone   Cloprednole, Fludrocortisone acétate, Hydrocortison acetate, Methylprednisolone, Prednisolone, Tixocortolpivalate Alfacortone, Fucidin H, Dermacalm, Hexacortone, Premandole, Imacort With group D2   B Triamcinolone-acetonide   Budenoside (R-isomer), Amcinonide, Desonide, Fluocinolone acetonide, Triamcinolone acetonide Locapred, Locatop  Synalar, Pevisone, Kenacort -   C Betamethasone (Without Rosenda)   Betamethasone, Dexamethasone, Flumethasone  pivalate, Halomethasone Daivobet, Dexasalyl, Locasalen,   -   D1 Betamethasone-diproprionate   Betamethasone dipropionate, Betamethasone-17-valerate, Clobetasole-propionate, Fluticasone propionate, Mometasone furoate Betnovate, Diprogenta, Diprosalic, Diprosone, Celestoderm, Fucicort,  Cutivate, Axotide, Elocom -   D2 Methylprednisolone-aceponate   Hydrocortisone-aceponate, Hydrocortisone-buteprate, Hydrocortisone-17-butyrate, Methylprednisolone aceponate, Prednicarbate Locoïd, Advantan,  Prednitop With group A and Budesonide (S-isomer)      Results of patch tests:                         Interpretation:  - Negative                    A    = Allergic      (+) Erythema    TI   = Toxic/irritant   + E + Infiltration    RaP = Relevance at Present     ++ E/I + Papulovesicle   Rpr  = Relevance Previously     +++ E/I/P + Blister     nR   = No Relevance    [x] No relevant allergic reaction observed    [] Allergic reaction diagnosed against following allergens:      Interpretation/ remarks:   See later    [] Patient information given   [] ACDS CAMP information's (# ....) to following compounds: .....   [] General information's to following compounds: ......      ____________________________________________    Assessment & Plan:    ==> Final Diagnosis:     # Chronic recurrent hair loss with LPP/FFA  Ruling out underlying contact allergy aggravating conditions  Ruling out underlying atopy aggravation conditions  * chronic illness with exacerbation, progression, side effects from treatment    # Atopic predisposition? with:   Positive FHx  No clear signs for atopy based on history and negative prick tests    These conclusions are made at the best of one's knowledge and belief based on the provided evidence such as patient's history and allergy test results and they can change over time or can be incomplete because of missing information.    ==> Treatment Plan:    >> Will update below treatment plan on Friday after 2nd readings  and final conclusions:  >> Until patch tests can be completed, continue treatment regimen set with Dr. Esquivel.      Procedures Performed: None    Staff Involved: Provider, Staff, and Scribe    Scribe Disclosure:   I, Adriano Denis, am serving as a scribe to document services personally performed by Walt Mclaughlin MD based on data collection and the provider's statements to me.     Staff Physician Comments:  I was present with the scribe who participated in the documentation of the note. I have verified the history and personally performed the physical exam and medical decision making. I agree with the assessment and plan as documented in the note. I have reviewed and if necessary amended the note.      Walt Mclaughlin MD  Professor  Head of Dermato-Allergy Division  Department of Dermatology  [facility]     Follow-up in Derm-Allergy clinic for 2nd readings and final conclusions after 4 days   - next scheduled to follow up with Dr. Esquivel on 4/25/25 for another ILK injection   ___________________________    I spent a total of 12 minutes with Maria Ines Coreas during today s  visit. This time was spent discussing all the individual test results, correlating them to the clinical relevance, counseling the patient and/or coordinating care and performing allergy tests or procedures.         Again, thank you for allowing me to participate in the care of your patient.        Sincerely,        Walt Mclaughlin MD    Electronically signed

## 2025-04-23 NOTE — PROGRESS NOTES
[facility] Dermato-allergology Note  Office visit  Encounter Date: Apr 23, 2025  ____________________________________________    CC: Allergy Testing Followup (Patch day 3)    HPI:  (Apr 23, 2025)  Ms. Maria Ines Coreas is a(n) 49 year old female who presents today as a return patient for allergy tests as planned  - Follow-up in Derm-Allergy clinic for 1st readings of patch tests after 2 days  - Otherwise feeling well in usual state of health    Physical Exam:  General: In no acute distress, well-developed, well-nourished  Eyes: no conjunctivitis  ENT: no signs of rhinitis   Pulmonary: no wheezing or coughing  Skin: Focused examination of the skin on test sites was performed = see test results below  No active eczematous skin lesions on tests sites, particularly back    Earlier History and Allergy Exams:  (Apr 21, 2025)  Presents today as a return patient for allergy tests as planned  - Follow-up in Derm-Allergy clinic for patch tests in 4/2025 or 5/2025  Day 3 of patch tests can be virtual  If she follows up with Dr. Esquivel around that time, would recommend she schedule follow up for after day 5 appointment time and then she can do repeat ILK injections if still needed     (Oct 18, 2024)  Presents today as new patient for allergy consultation  - Referred by Dr. Esquivel on 6/14/24 for LPP/FFA with stable hair loss but persistent erythema/scale and pruritus so wanting to rule out allergic contact dermatitis as a component  - Scheduled to follow up with Dr. Esquivel at 09:30  - Has had scalp issues for approaching 10 years  - Frontal scalp is itchy and sometimes scaly and dry  - Has never noticed involvement on any other part of back  - No other skin involvement     Skin: Focused examination of the skin on test sites was performed = see test results below  Focused examination of the scalp was performed.  - On the front scalp, no signs of inflammation or desquamation.   - On the occipital scalp,  no signs of erythema or desquamation.    Past Medical History:   There is no problem list on file for this patient.    No past medical history on file.    Allergies:  No Known Allergies    Medications:  Current Outpatient Medications   Medication Sig Dispense Refill    bimatoprost (LUMIGAN) 0.03 % ophthalmic solution Apply to eyebrows nightly 5 mL 6    COMPOUNDED NON-CONTROLLED SUBSTANCE (CMPD RX) - PHARMACY TO MIX COMPOUNDED MEDICATION Apply to scalp twice daily as needed 10 mL 3    doxycycline monohydrate (MONODOX) 100 MG capsule Take 1 capsule (100 mg) by mouth 2 times daily. 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 30 g 3    hydroxychloroquine (PLAQUENIL) 200 MG tablet Take 1 tablet (200 mg) by mouth 2 times daily 180 tablet 3    ketoconazole (NIZORAL) 2 % external shampoo Apply topically daily as needed for itching or irritation 120 mL 11    minoxidil (LONITEN) 2.5 MG tablet Take 1 tablet by mouth once daily 90 tablet 3    prednisoLONE acetate (PRED FORTE) 1 % ophthalmic suspension INSTILL 1 DROP INTO EACH EYE THREE TIMES A WEEK      tacrolimus (PROTOPIC) 0.1 % external ointment Apply 1-2 times daily to scalp. 60 g 6    valACYclovir (VALTREX) 500 MG tablet Take 500 mg by mouth daily       Current Facility-Administered Medications   Medication Dose Route Frequency Provider Last Rate Last Admin    triamcinolone acetonide (KENALOG-10) injection 10 mg  10 mg Intra-Lesional Once Renee Aparicio MD        triamcinolone acetonide (KENALOG-10) injection 5 mg  5 mg Intra-Lesional Once Renee Aparicio MD         Previous Labs, Allergy Tests, Dermatopathology, Imaging:  [Upon review of Epic chart, no prior allergy records as of 10/18/24.]     6/14/24 Dr. Warner  Alvin (derm) - most recent derm visit in [facility]  DERM PROBLEM LIST:  1. 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 She tried the tacrolimus solution but discontinued due to burning and irritation. Not sure if she notices much difference with the doxycycline  - Continues doxycycline 100 mg BID, hydroxychloroquine 200 mg BID (last eye exam in April), oral minoxidil 2.5 mg daily, finasteride 5 mg daily, Lidex solution, and keto shampoo & T-Sal shampoo (alternates; washes hair daily).   - Does continue to notices hair loss and inflammation. Today is a relatively good day, but sometimes the pruritus can be pretty bad. Also has associated hair loss of eyebrows and eyelashes.   - ILK helps, but she does notice some atrophy. She says her local derm has been doing ILK 5 instead of 10 and that seems to have similar efficacy with less atrophy    FROM A&P:  # FFA/LPP s/p bx 2018  Overall with continued mild-moderate disease activity but fairly stable in comparison to prior photos. Also with ongoing symptoms of intermittent pruritus. Discussed the risks and benefits of further therapy options, including mycophenolate, tofacitinib solution, or switching finasteride to dutasteride. Also reviewed the possibility of allergic contact dermatitis given evidence of persistent inflammation but stable hair loss.  - ILK 10 injections performed today (will plan  to go down to ILK 5 in the future). See procedure note below.  - Referral placed for patch testing to r/o ACD component  - Stop doxycycline given lack of significant response  - Start tofacitinib solution BID as needed (to [place])  - Continue hydroxychloroquine 200 mg BID  - Repeat safety labs for hydroxychloroquine (CBC and CMP); continue annual eye exams with optho (last 4/2024)  We forgot to have pt go down to lab so will contact her and see if she wants to get labs closer to home  - Continue oral minoxidil 2.5 mg daily  - Continue finasteride 5 mg daily  - Continue ketoconazole 2% shampoo 3 times per week, alternating with T-sal shampoo  - Continue fluocinonide solution nightly        Referred By: Alana Esquivel MD (derm)  [address] [place] [id number]     Allergy Tests:  Past Allergy Test    Family History:  Family History   Problem Relation Age of Onset    Skin Cancer Father      Dad and now her son (16 y/o) with very dry, cracking hands.  Mom has pollen allergies/hay fever.  Daughter has runny/drippy nose and takes allergy medication regularly.    Social History:  The patient works as an , currently in more of a supervisory position. Patient has the following hobbies or non-occupational exposure: run.  Does not work with paint/art work.    Order for Future Allergy Testing:    [x] Outpatient  [] Inpatient: [facility]..../ Bed ...    Skin Atopy (atopic dermatitis)? [] Yes     [x] No  Comments:   Childhood eczema?   [] Yes     [x] No  Comments:   Hand eczema?   [] Yes     [x] No  If yes, leading hand? [] Right     [] Left     [] Ambidextrous  Comments:     Contact allergies?   [x] Yes     [] No  Comments: adds hi-lites to her hair  Including adhesives/bandages? [] Yes     [x] No  Comments:   Including metals?   [x] Yes     [] No  Comments:   - wears a few rings currently  - used to have more ear piercings, but ears would get red and inflamed, so stopped wearing them    Drug allergies?   [] Yes      [x] No  Comments:     Angioedema?   [] Yes     [x] No  Comments:     Urticaria?   [x] Yes     [] No  Comments: has at some point in her life, rarely and a handful of time, did not last long and she is unaware of trigger    Food allergies?   [] Yes     [x] No  Comments:     Pet allergies?   [] Yes     [x] No  Comments: has 1 dog and 1 cat; has had dog and/or cat for >20 years, and has never noticed reactions around them    Environmental allergy symptoms?  [] Conjunctivitis  [] Otitis  [] Pharyngitis  [] Polyposis  [] Postnasal Drip  [] Rhinitis  [] Sinusitis  [x] None  Comments: many years ago, only had to take an allergy pill for 1 year when she had watery feeling in ears    HENT Operations?  [] Adenoids [] Septum [] Sinus  [] Tonsils        [x] Other: wisdom teeth extraction  [] None  Comments:     Pulmonary symptoms (from birth to present)?  [] Asthma bronchiale  Inhaler(s)?:   [] Coughing  [] Other  [x] None  Comments:     Environmental and pulmonary symptoms aggravated by?  Season: [x] None     [] I     [] II     [] III     [] IV     [] V     [] VI          [] VII     [] VIII     [] IX     [] X     [] XI     [] XII     [] Perennial  Time of Day: [x] None     [] Morning     [] Noon     [] Evening     [] Night     [] Whole Day  Location/Changes: [x] None     [] Inside     [] Outside     [] Mountain     [] Sea     [] Other:   Triggers (specific): [x] None     [] Animals     [] Dust     [] Mold     [] Plants     [] Other:   Triggers (other): [x] None     [] Psyche     [] Sport     [] Work     [] Other:   Irritant: [x] None     [] Cold     [] Heat     [] Odors     [] Physical Efforts     [] Smoke     [] Other:     Order for PATCH TESTS  Reason for tests (suspected allergy): ruling out ACD component of chronic, recurrent, therapy-resistant LPP/FFA  Known previous allergies: none  Standardized panels  [x] Standard panel (40 tests)  [x] Preservatives & Antimicrobials (31 tests)  [x] Emulsifiers & Additives (25 tests)    [x] Perfumes/Flavours & Plants (25 tests)  [x] Hairdresser panel (12 tests)  [] Rubber Chemicals (22 tests)  [] Plastics (26 tests)  [] Colorants/Dyes/Food additives (20 tests)  [] Metals (implants/dental) (24 tests)  [] Local anaesthetics/NSAIDs (13 tests)  [] Antibiotics & Antimycotics (14 tests)   [x] Corticosteroids (15 tests)   [] Photopatch test (62 tests)   [] Others:     [] Patient's Own Products:   DO NOT test if chemical or biological identity is unknown!   always ask from patient the product information and safety sheets (MSDS)       Order for PRICK TESTS    Reason for tests (suspected allergy): atopy? No clear signs in history other than positive FHx  Known previous allergies: none    Standardized prick panels  [x] Atopic panel (20 tests)  [] Pediatric Panel (12 tests)  [] Milk, Meat, Eggs, Grains (20 tests)   [] Dust, Epithelia, Feathers (10 tests)  [] Fish, Seafood, Shellfish (17 tests)  [] Nuts, Beans (14 tests)  [] Spice, Vegetable, Fruit (17 tests)  [] Pollen Panel = Tree, Grass, Weed (24 tests)  [] Others:     [] Patient's Own Products:   DO NOT test if chemical or biological identity is unknown!   always ask from patient the product information and safety sheets (MSDS)     Standardized intradermal tests  [] Alternaria alternata  [] Aspergillus fumigatus  [] Cladosporium herbarum   [] Penicillium notatum  [] Dermatophagoides farinae  [] Dermatophagoides pteronyssinus  [] Dog Epithelium  [] Cat Epithelium  [] Others:     [] Bee venom   [] Wasp venom  !!Specific protocol with dilutions!!       Order for Drug allergy tests (prick & intradermal & patch tests)    [] Penicillin G     [] Ampicillin   [] Cefazolin        [] Ceftriaxone     [] Ceftazidime     [] Cefepime     [] Cefuroxime  [] Bactrim  [] Iodixanol     [] Iopamidol        [] Iohexol  [] Others:   [] Patient's Own:   Order for N/A as test date      Atopy Screen (Placed Oct 18, 2024)  No Substance Readings (15 min) Evaluation   POS Histamine  1mg/ml ++    NEG NaCl 0.9% -      No Substance Readings (15 min) Evaluation   1 Alternaria alternata (tenuis)  -    2 Cladosporium herbarum -    3 Aspergillus fumigatus -    4 Penicillium notatum -    5 Dermatophagoides pteronyssinus -    6 Dermatophagoides farinae -    7 Dog epithelium (canis spp) -    8 Cat hair (phi catus) -    9 Cockroach   (Blatella americana & germanica) -    10 Grass mix midwest   (Chantal, Orchard, Redtop, Patel) -    11 Gaurang grass (sorghum halepense) -    12 Weed mix   (common Cocklebur, Lamb s quarters, rough redroot Pigweed, Dock/Sorrel) -    13 Mug wort (artemisia vulgare) -    14 Ragweed giant/short (ambrosia spp) -    15 White birch (Betula papyrifera) -    16 Tree mix 1 (Pecan, Maple BHR, Oak RVW, american Plover, black Ursa) -    17 Red cedar (juniperus virginia) -    18 Tree mix 2   (white Gerson, river/red Birch, black Jenkins, common Nuckolls, american Elm) -    19 Box elder/Maple mix (acer spp) -    20 Whitman shagbark (carya ovata) -    Conclusion:     RESULTS & EVALUATION of PATCH TESTS    Apr 21, 2025 application of patch tests:    Patch test readings after     [x] 2 days, [] 3 days [x] 4 days, [] 5 days,  Other duration: ...     STANDARD Series                                          # Substance 2 days 4 days remarks     1 Graham Mix III 10% - -       2 Colophony - -       3  2-Mercaptobenzothiazole  - -       4 Methylisothiazolinone - -       5 Carba Mix - -       6 Thiuram Mix [A] - -       7 Bisphenol A Epoxy Resin - -       8 D-Vdcb-Imvkpdquaqn-Formaldehyde Resin - -       9 Mercapto Mix [A] - -       10 Black Rubber Mix- PPD [B] - -       11 Potassium Dichromate  -  -       12 Balsam of Peru (Myroxylon Pereirae Resin) ? -       13 Nickel Sulphate Hexahydrate - -       14 Mixed Dialkyl Thiourea - -       15 Paraben Mix [B] - -       16 Methyldibromo Glutaronitrile - -       17 Fragrance Mix 8% - -       18 2-Bromo-2-Nitropropane-1,3-Diol (Bronopol) CT - -       19  Lyral - -       20 Tixocortol-21- Pivalate CT - -       21 Diazolidinyl urea (Germall II) - -        22 Methyl Methacrylate - -       23 Cobalt (II) Chloride Hexahydrate - -       24 Fragrance Mix II  - -       25 Compositae Mix II - -       26 Benzoyl Peroxide - -       27 Bacitracin - -       28 Formaldehyde - -       29 Methylchloroisothiazolinone / Methylisothiazolinone - -       30 Corticosteroid Mix CT - -       31 Sodium Lauryl Sulfate - -       32 Lanolin Alcohol - -       33 Turpentine - -       34 Cetylstearylalcohol - -       35 Chlorhexidine Dicluconate - -       36 Budesonide - -       37 Imidazolidinyl Urea  - -       38 Ethyl-2 Cyanoacrylate - -       39 Quaternium 15 (Dowicil 200) - -       40 Decyl Glucoside - -     Compositae Mix II - common yarrow, mountain arnica, Yakut chamomile, feverfew, and the common tansy   PRESERVATIVES & ANTIMICROBIALS        # Substance 2 days 4 days remarks   41 1 1,2-Benzisothiazoline-3-One, Sodium Salt - -     2 1,3,5-Maldonado (2-Hydroxyethyl) - Hexahydrotriazine (Grotan BK) - -     3 Dichlorophene - -     4 3, 4, 4' - Triclocarban - -    45 5 4 - Chloro - 3 - Cresol - -     6 4 - Chloro - 4 - Xylenol (PCMX) - -     7 7-Ethylbicyclooxazolidine (Bioban PE1717) - -     8 Benzalkonium Chloride CT - -     9 Benzyl Alcohol - -    50 10 Cetalkonium Chloride - -     11 Cetylpyrimidine Chloride  - -     12 Chloroacetamide - -     13 DMDM Hydantoin - -     14 Glutaraldehyde - -    55 15 Triclosan - -     16 Glyoxal Trimeric Dihydrate - -     17 Iodopropynyl Butylcarbamate - -     18 Octylisothiazoline - -     19 Acetophenone Azine - -    60 20 Bioban P 1487 (Nitrobutyl) Morpholine/(Ethylnitro-Trimethylene) Dimorpholine - -     21 Phenoxyethanol - -     22 Phenyl Salicylate - -     23 Povidone Iodine - -     24 Sodium Benzoate - -    65 25 Sodium Disulfite - -     26 Sorbic Acid - -     27 Thimerosal - -     28 Melamine Formaldehyde Resin - -     29 Ethylenediamine Dihydrochloride  - -      Parabens      70 30 Butyl-P-Hydroxybenzoate - -     31 Ethyl-P-Hydroxybenzoate - -     32 Methyl-P-Hydroxybenzoate - -    73 33 Propyl-P-Hydroxybenzoate - -     EMULSIFIERS & ADDITIVES       # Substance 2 days 4 days remarks   74 1 Polyethylene Glycol-400 - -    75 2 Cocamidopropyl Betaine - -     3 Amerchol L101 - -     4 Propylene Glycol - -     5 Triethanolamine - -     6 Sorbitane Sesquiolate CT - -    80 7 Isopropylmyristate - -     8 Polysorbate 80 CT - -     9 Amidoamine   (Stearamidopropyl Dimethylamine) - -     10 Oleamidopropyl Dimethylamine - -     11 Lauryl Glucoside - -    85 12 Coconut Diethanolamide  - -     13 2-Hydroxy-4-Methoxy Benzophenone (Oxybenzone) - -     14 Benzophenone-4 (Sulisobenzon) - -     15 Propolis - -     16 Dexpanthenol - -    90 17 Abitol (Hydroabietyl Alcohol) - -     18 Tert-Butylhydroquinone - -     19 Benzyl Salicylate - -     20 Dimethylaminopropylamin (DMPA) - -     21 Zinc Pyrithione (Zinc Omadine)  - -    95 22 Maldonado(Hydroxymethyl) Nitromethane  - -      Antioxidant       23 Dodecyl Gallate - -     24 Butylhydroxyanisole (BHA) - -     25 Butylhydroxytoluene (BHT) - -     26 Di-Alpha-Tocopherol (Vit E) - -    100 27 Propyl Gallate - -     PERFUMES, FLAVORS & PLANTS        # Substance 2 days 4 days remarks   101 1 Benzyl Cinnamate - -     2 Di-Limonene (Dipentene) - -     3 Cananga Odorata (Sesar Kaba) (I) - -     4 Lichen Acid Mix - -    105 5 Mentha Piperita Oil (Peppermint Oil) - -     6 Sesquiterpenelactone mix - -     7 Tea Tree Oil, Oxidized - -     8 Wood Tar Mix - -     9 Abietic Acid - -    110 10 Lavendula Angustifolia Oil (Lavender Oil) - -     11 Fragrance mix II CT * 14% - -      Fragrance Mix I       12 Oakmoss Absolute - -     13 Eugenol - -     14 Geraniol - -    115 15 Hydroxycitronellal - -     16 Isoeugenol - -     17 Cinnamic Aldehyde - -     18 Cinnamic Alcohol  - -      Fragrance mix II       19 Citronellol - -    120 20 Alpha-Hexylcinnamic  Aldehyde    - -     21 Citral - -     22 Farnesol - -    123 23 Coumarin - -    Hexylcinnamic aldehyde, Coumarin, Farnesol, Hydroxyisohexy3-cyclohexene carboxaldehyde, citral, citrolellol   HAIRDRESSER        # Substance 2 days 4 days remarks   124 1 P-Phenylenediamine -  -    125 2 P-Aminodiphenylamine - -     3 P-Toluenediamine Sulphate  -  -     4 Ammonium Thioglycolate - -     5 Ammonium Persulfate - -     6 Resorcinol - -    130 7 3-Aminophenol - -     8 P-Aminophenol - -     9 Glyceryl Monothioglycolate - -    133 10 Pyrogallol - -     CORTICOSTEROIDS   # Substance 2 days 4 days remarks Allergy  class   134 1 Amcinonide - -  B   135 2 Betametasone-17,21 Dipropionate - -  D1    3 Desoximetasone - -  C    4 Betamethasone-17-Valerate - -  D1    5 Hydrocortisone - -  A    6 Clobetasol-17-Propionate - -  D1   140 7 Dexamethasone-21-Phosphate Disodium Salt - -  C    8 Hydrocortisone-17 Butyrate - -  D2    9 Prednisolone - -  A    10 Triamcinolone Acetonide - -  B    11 Methylprednisolone Aceponate - -  D2   145 12 Hydrocortisone-21-Acetate - -  A   146 13 Prednicarbate - -  D2     Group Characteristics of group Generic name Name  cross reactions   A Hydrocortisone   Cloprednole, Fludrocortisone acétate, Hydrocortison acetate, Methylprednisolone, Prednisolone, Tixocortolpivalate Alfacortone, Fucidin H, Dermacalm, Hexacortone, Premandole, Imacort With group D2   B Triamcinolone-acetonide   Budenoside (R-isomer), Amcinonide, Desonide, Fluocinolone acetonide, Triamcinolone acetonide Locapred, Locatop  Synalar, Pevisone, Kenacort -   C Betamethasone (Without Rosenda)   Betamethasone, Dexamethasone, Flumethasone pivalate, Halomethasone Daivobet, Dexasalyl, Locasalen,   -   D1 Betamethasone-diproprionate   Betamethasone dipropionate, Betamethasone-17-valerate, Clobetasole-propionate, Fluticasone propionate, Mometasone furoate Betnovate, Diprogenta, Diprosalic, Diprosone, Celestoderm, Fucicort,  Cutivate, Axotide, Elocom -    D2 Methylprednisolone-aceponate   Hydrocortisone-aceponate, Hydrocortisone-buteprate, Hydrocortisone-17-butyrate, Methylprednisolone aceponate, Prednicarbate Locoïd, Advantan,  Prednitop With group A and Budesonide (S-isomer)      Results of patch tests:                         Interpretation:  - Negative                    A    = Allergic      (+) Erythema    TI   = Toxic/irritant   + E + Infiltration    RaP = Relevance at Present     ++ E/I + Papulovesicle   Rpr  = Relevance Previously     +++ E/I/P + Blister     nR   = No Relevance    [x] No relevant allergic reaction observed    [] Allergic reaction diagnosed against following allergens:      Interpretation/ remarks:   See later    [] Patient information given   [] ACDS CAMP information's (# ....) to following compounds: .....   [] General information's to following compounds: ......      ____________________________________________    Assessment & Plan:    ==> Final Diagnosis:     # Chronic recurrent hair loss with LPP/FFA  Ruling out underlying contact allergy aggravating conditions  Ruling out underlying atopy aggravation conditions  * chronic illness with exacerbation, progression, side effects from treatment    # Atopic predisposition? with:   Positive FHx  No clear signs for atopy based on history and negative prick tests    These conclusions are made at the best of one's knowledge and belief based on the provided evidence such as patient's history and allergy test results and they can change over time or can be incomplete because of missing information.    ==> Treatment Plan:    >> Will update below treatment plan on Friday after 2nd readings and final conclusions:  >> Until patch tests can be completed, continue treatment regimen set with Dr. Esquivel.      Procedures Performed: None    Staff Involved: Provider, Staff, and Scribe    Scribe Disclosure:   JAY, Adriano Denis, am serving as a scribe to document services personally performed by Walt ESCOBAR  MD Arnoldo based on data collection and the provider's statements to me.     Staff Physician Comments:  I was present with the scribe who participated in the documentation of the note. I have verified the history and personally performed the physical exam and medical decision making. I agree with the assessment and plan as documented in the note. I have reviewed and if necessary amended the note.      Walt Mclaughlin MD  Professor  Head of Dermato-Allergy Division  Department of Dermatology  [facility]     Follow-up in Derm-Allergy clinic for 2nd readings and final conclusions after 4 days   - next scheduled to follow up with Dr. Esquivel on 4/25/25 for another ILK injection   ___________________________    I spent a total of 12 minutes with Maria Ines Coreas during today s  visit. This time was spent discussing all the individual test results, correlating them to the clinical relevance, counseling the patient and/or coordinating care and performing allergy tests or procedures.

## 2025-04-23 NOTE — NURSING NOTE
Chief Complaint   Patient presents with    Allergy Testing Followup     Patch day 3     Francoise Saucedo RN

## 2025-04-25 ENCOUNTER — OFFICE VISIT (OUTPATIENT)
Dept: ALLERGY | Facility: CLINIC | Age: 50
End: 2025-04-25
Payer: COMMERCIAL

## 2025-04-25 ENCOUNTER — OFFICE VISIT (OUTPATIENT)
Dept: DERMATOLOGY | Facility: CLINIC | Age: 50
End: 2025-04-25
Payer: COMMERCIAL

## 2025-04-25 DIAGNOSIS — L66.12 FRONTAL FIBROSING ALOPECIA: ICD-10-CM

## 2025-04-25 DIAGNOSIS — L66.10 LICHEN PLANOPILARIS: Primary | ICD-10-CM

## 2025-04-25 DIAGNOSIS — L66.10 LICHEN PLANOPILARIS: ICD-10-CM

## 2025-04-25 DIAGNOSIS — L23.5 ALLERGIC DERMATITIS DUE TO OTHER CHEMICAL PRODUCT: Primary | ICD-10-CM

## 2025-04-25 DIAGNOSIS — L23.9 ALLERGIC CONTACT DERMATITIS, UNSPECIFIED TRIGGER: ICD-10-CM

## 2025-04-25 PROCEDURE — 11901 INJECT SKIN LESIONS >7: CPT | Performed by: DERMATOLOGY

## 2025-04-25 PROCEDURE — 99214 OFFICE O/P EST MOD 30 MIN: CPT | Mod: 25 | Performed by: DERMATOLOGY

## 2025-04-25 PROCEDURE — 1126F AMNT PAIN NOTED NONE PRSNT: CPT | Performed by: DERMATOLOGY

## 2025-04-25 PROCEDURE — 99214 OFFICE O/P EST MOD 30 MIN: CPT | Performed by: DERMATOLOGY

## 2025-04-25 ASSESSMENT — PAIN SCALES - GENERAL: PAINLEVEL_OUTOF10: NO PAIN (0)

## 2025-04-25 NOTE — NURSING NOTE
Drug Administration Record    Prior to injection, verified patient identity using patient's name and date of birth.  Due to injection administration, patient instructed to remain in clinic for 15 minutes  afterwards, and to report any adverse reaction to me immediately.    Drug Name: triamcinolone acetonide(kenalog)  Dose: 3 mL of triamcinolone 10mg/mL, 30 mg dose  Route administered: ID  NDC #: 1077-9268-96  Amount of waste(mL):2 mL  Reason for waste: Multi dose vial    LOT #: [id number]  SITE: See chart notes  : [employer]  EXPIRATION DATE: 08/2027

## 2025-04-25 NOTE — PROGRESS NOTES
[facility] Dermato-allergology Note  Office visit  Encounter Date: Apr 25, 2025  ____________________________________________    CC: Allergy Testing Followup (Patch day 5)    HPI:  (Apr 25, 2025)  Ms. Maria Ines Coreas is a(n) 49 year old female who presents today as a return patient for allergy tests as planned  - in clinic for final readings and conclusions  - Otherwise feeling well in usual state of health    Physical Exam:  General: In no acute distress, well-developed, well-nourished  Eyes: no conjunctivitis  ENT: no signs of rhinitis   Pulmonary: no wheezing or coughing  Skin: Focused examination of the skin on test sites was performed = see test results below  No active eczematous skin lesions on tests sites, particularly back    Earlier History and Allergy Exams:  (Apr 23, 2025)  Presents today as a return patient for allergy tests as planned  - Follow-up in Derm-Allergy clinic for 1st readings of patch tests after 2 days     (Apr 21, 2025)  Presents today as a return patient for allergy tests as planned  - Follow-up in Derm-Allergy clinic for patch tests in 4/2025 or 5/2025  Day 3 of patch tests can be virtual  If she follows up with Dr. Esquivel around that time, would recommend she schedule follow up for after day 5 appointment time and then she can do repeat ILK injections if still needed     (Oct 18, 2024)  Presents today as new patient for allergy consultation  - Referred by Dr. Esquivel on 6/14/24 for LPP/FFA with stable hair loss but persistent erythema/scale and pruritus so wanting to rule out allergic contact dermatitis as a component  - Scheduled to follow up with Dr. Esquivel at 09:30  - Has had scalp issues for approaching 10 years  - Frontal scalp is itchy and sometimes scaly and dry  - Has never noticed involvement on any other part of back  - No other skin involvement     Skin: Focused examination of the skin on test sites was performed = see test results below  Focused  examination of the scalp was performed.  - On the front scalp, no signs of inflammation or desquamation.   - On the occipital scalp, no signs of erythema or desquamation.    Past Medical History:   There is no problem list on file for this patient.    No past medical history on file.    Allergies:  No Known Allergies    Medications:  Current Outpatient Medications   Medication Sig Dispense Refill    bimatoprost (LUMIGAN) 0.03 % ophthalmic solution Apply to eyebrows nightly 5 mL 6    COMPOUNDED NON-CONTROLLED SUBSTANCE (CMPD RX) - PHARMACY TO MIX COMPOUNDED MEDICATION Apply to scalp twice daily as needed 10 mL 3    doxycycline monohydrate (MONODOX) 100 MG capsule Take 1 capsule (100 mg) by mouth 2 times daily. 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 30 g 3    hydroxychloroquine (PLAQUENIL) 200 MG tablet Take 1 tablet (200 mg) by mouth 2 times daily 180 tablet 3    ketoconazole (NIZORAL) 2 % external shampoo Apply topically daily as needed for itching or irritation 120 mL 11    minoxidil (LONITEN) 2.5 MG tablet Take 1 tablet by mouth once daily 90 tablet 3    prednisoLONE acetate (PRED FORTE) 1 % ophthalmic suspension INSTILL 1 DROP INTO EACH EYE THREE TIMES A WEEK      tacrolimus (PROTOPIC) 0.1 % external ointment Apply 1-2 times daily to scalp. 60 g 6    valACYclovir (VALTREX) 500 MG tablet Take 500 mg by mouth daily       Current Facility-Administered Medications   Medication Dose Route Frequency Provider Last Rate Last Admin    triamcinolone acetonide (KENALOG-10) injection 10 mg  10 mg Intra-Lesional Once Renee Aparicio MD        triamcinolone acetonide (KENALOG-10) injection 5 mg  5 mg Intra-Lesional Once Renee Aparicio MD         Previous Labs, Allergy  Tests, Dermatopathology, Imaging:  [Upon review of Epic chart, no prior allergy records as of 10/18/24.]     6/14/24 Dr. Alana Esquivel (derm) - most recent derm visit in [facility]  DERM PROBLEM LIST:  1. 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 She tried the tacrolimus solution but discontinued due to burning and irritation. Not sure if she notices much difference with the doxycycline  - Continues doxycycline 100 mg BID, hydroxychloroquine 200 mg BID (last eye exam in April), oral minoxidil 2.5 mg daily, finasteride 5 mg daily, Lidex solution, and keto shampoo & T-Sal shampoo (alternates; washes hair daily).   - Does continue to notices hair loss and inflammation. Today is a relatively good day, but sometimes the pruritus can be pretty bad. Also has associated hair loss of eyebrows and eyelashes.   - ILK helps, but she does notice some atrophy. She says her local derm has been doing ILK 5 instead of 10 and that seems to have similar efficacy with less atrophy    FROM A&P:  # FFA/LPP s/p bx 2018  Overall with continued mild-moderate disease activity but fairly stable in comparison to prior photos. Also with ongoing symptoms of intermittent pruritus. Discussed the risks and benefits of further therapy options, including mycophenolate, tofacitinib solution, or switching finasteride to dutasteride. Also reviewed the possibility of allergic  contact dermatitis given evidence of persistent inflammation but stable hair loss.  - ILK 10 injections performed today (will plan to go down to ILK 5 in the future). See procedure note below.  - Referral placed for patch testing to r/o ACD component  - Stop doxycycline given lack of significant response  - Start tofacitinib solution BID as needed (to [place])  - Continue hydroxychloroquine 200 mg BID  - Repeat safety labs for hydroxychloroquine (CBC and CMP); continue annual eye exams with optho (last 4/2024)  We forgot to have pt go down to lab so will contact her and see if she wants to get labs closer to home  - Continue oral minoxidil 2.5 mg daily  - Continue finasteride 5 mg daily  - Continue ketoconazole 2% shampoo 3 times per week, alternating with T-sal shampoo  - Continue fluocinonide solution nightly        Referred By: Alana Esquivel MD (derm)  [address] [place] [address]     Allergy Tests:  Past Allergy Test    Family History:  Family History   Problem Relation Age of Onset    Skin Cancer Father      Dad and now her son (16 y/o) with very dry, cracking hands.  Mom has pollen allergies/hay fever.  Daughter has runny/drippy nose and takes allergy medication regularly.    Social History:  The patient works as an , currently in more of a supervisory position. Patient has the following hobbies or non-occupational exposure: run.  Does not work with paint/art work.    Order for Future Allergy Testing:    [x] Outpatient  [] Inpatient: [facility]..../ Bed ...    Skin Atopy (atopic dermatitis)? [] Yes     [x] No  Comments:   Childhood eczema?   [] Yes     [x] No  Comments:   Hand eczema?   [] Yes     [x] No  If yes, leading hand? [] Right     [] Left     [] Ambidextrous  Comments:     Contact allergies?   [x] Yes     [] No  Comments: adds hi-lites to her hair  Including adhesives/bandages? [] Yes     [x] No  Comments:   Including metals?   [x] Yes     [] No  Comments:   - wears a few rings  currently  - used to have more ear piercings, but ears would get red and inflamed, so stopped wearing them    Drug allergies?   [] Yes     [x] No  Comments:     Angioedema?   [] Yes     [x] No  Comments:     Urticaria?   [x] Yes     [] No  Comments: has at some point in her life, rarely and a handful of time, did not last long and she is unaware of trigger    Food allergies?   [] Yes     [x] No  Comments:     Pet allergies?   [] Yes     [x] No  Comments: has 1 dog and 1 cat; has had dog and/or cat for >20 years, and has never noticed reactions around them    Environmental allergy symptoms?  [] Conjunctivitis  [] Otitis  [] Pharyngitis  [] Polyposis  [] Postnasal Drip  [] Rhinitis  [] Sinusitis  [x] None  Comments: many years ago, only had to take an allergy pill for 1 year when she had watery feeling in ears    HENT Operations?  [] Adenoids [] Septum [] Sinus  [] Tonsils        [x] Other: wisdom teeth extraction  [] None  Comments:     Pulmonary symptoms (from birth to present)?  [] Asthma bronchiale  Inhaler(s)?:   [] Coughing  [] Other  [x] None  Comments:     Environmental and pulmonary symptoms aggravated by?  Season: [x] None     [] I     [] II     [] III     [] IV     [] V     [] VI          [] VII     [] VIII     [] IX     [] X     [] XI     [] XII     [] Perennial  Time of Day: [x] None     [] Morning     [] Noon     [] Evening     [] Night     [] Whole Day  Location/Changes: [x] None     [] Inside     [] Outside     [] Mountain     [] Sea     [] Other:   Triggers (specific): [x] None     [] Animals     [] Dust     [] Mold     [] Plants     [] Other:   Triggers (other): [x] None     [] Psyche     [] Sport     [] Work     [] Other:   Irritant: [x] None     [] Cold     [] Heat     [] Odors     [] Physical Efforts     [] Smoke     [] Other:     Order for PATCH TESTS  Reason for tests (suspected allergy): ruling out ACD component of chronic, recurrent, therapy-resistant LPP/FFA  Known previous allergies:  none  Standardized panels  [x] Standard panel (40 tests)  [x] Preservatives & Antimicrobials (31 tests)  [x] Emulsifiers & Additives (25 tests)   [x] Perfumes/Flavours & Plants (25 tests)  [x] Hairdresser panel (12 tests)  [] Rubber Chemicals (22 tests)  [] Plastics (26 tests)  [] Colorants/Dyes/Food additives (20 tests)  [] Metals (implants/dental) (24 tests)  [] Local anaesthetics/NSAIDs (13 tests)  [] Antibiotics & Antimycotics (14 tests)   [x] Corticosteroids (15 tests)   [] Photopatch test (62 tests)   [] Others:     [] Patient's Own Products:   DO NOT test if chemical or biological identity is unknown!   always ask from patient the product information and safety sheets (MSDS)       Order for PRICK TESTS    Reason for tests (suspected allergy): atopy? No clear signs in history other than positive FHx  Known previous allergies: none    Standardized prick panels  [x] Atopic panel (20 tests)  [] Pediatric Panel (12 tests)  [] Milk, Meat, Eggs, Grains (20 tests)   [] Dust, Epithelia, Feathers (10 tests)  [] Fish, Seafood, Shellfish (17 tests)  [] Nuts, Beans (14 tests)  [] Spice, Vegetable, Fruit (17 tests)  [] Pollen Panel = Tree, Grass, Weed (24 tests)  [] Others:     [] Patient's Own Products:   DO NOT test if chemical or biological identity is unknown!   always ask from patient the product information and safety sheets (MSDS)     Standardized intradermal tests  [] Alternaria alternata  [] Aspergillus fumigatus  [] Cladosporium herbarum   [] Penicillium notatum  [] Dermatophagoides farinae  [] Dermatophagoides pteronyssinus  [] Dog Epithelium  [] Cat Epithelium  [] Others:     [] Bee venom   [] Wasp venom  !!Specific protocol with dilutions!!       Order for Drug allergy tests (prick & intradermal & patch tests)    [] Penicillin G     [] Ampicillin   [] Cefazolin        [] Ceftriaxone     [] Ceftazidime     [] Cefepime     [] Cefuroxime  [] Bactrim  [] Iodixanol     [] Iopamidol        [] Iohexol  [] Others:    [] Patient's Own:   Order for N/A as test date      Atopy Screen (Placed Oct 18, 2024)  No Substance Readings (15 min) Evaluation   POS Histamine 1mg/ml ++    NEG NaCl 0.9% -      No Substance Readings (15 min) Evaluation   1 Alternaria alternata (tenuis)  -    2 Cladosporium herbarum -    3 Aspergillus fumigatus -    4 Penicillium notatum -    5 Dermatophagoides pteronyssinus -    6 Dermatophagoides farinae -    7 Dog epithelium (canis spp) -    8 Cat hair (phi catus) -    9 Cockroach   (Blatella americana & germanica) -    10 Grass mix midwest   (Chantal, Orchard, Redtop, Patel) -    11 Gaurang grass (sorghum halepense) -    12 Weed mix   (common Cocklebur, Lamb s quarters, rough redroot Pigweed, Dock/Sorrel) -    13 Mug wort (artemisia vulgare) -    14 Ragweed giant/short (ambrosia spp) -    15 White birch (Betula papyrifera) -    16 Tree mix 1 (Pecan, Maple BHR, Oak RVW, american Tippo, black Gardiner) -    17 Red cedar (juniperus virginia) -    18 Tree mix 2   (white [place], river/red Birch, black [place], common [place], american Elm) -    19 Box elder/Maple mix (acer spp) -    20 Naranjito shagbark (carya ovata) -    Conclusion:     RESULTS & EVALUATION of PATCH TESTS    Apr 21, 2025 application of patch tests:    Patch test readings after     [x] 2 days, [] 3 days [x] 4 days, [] 5 days,  Other duration: ...     STANDARD Series                                          # Substance 2 days 4 days remarks     1 Graham Mix III 10% - -       2 Colophony - -       3  2-Mercaptobenzothiazole  - -       4 Methylisothiazolinone - -       5 Carba Mix - -       6 Thiuram Mix [A] - -       7 Bisphenol A Epoxy Resin - -       8 T-Veou-Ywaemfqvtcy-Formaldehyde Resin - -       9 Mercapto Mix [A] - -       10 Black Rubber Mix- PPD [B] - -       11 Potassium Dichromate  -  -       12 Balsam of [place] (Myroxylon Pereirae Resin) ? -       13 Nickel Sulphate Hexahydrate - -       14 Mixed Dialkyl Thiourea - -       15 Paraben Mix  [B] - -       16 Methyldibromo Glutaronitrile - -       17 Fragrance Mix 8% - -       18 2-Bromo-2-Nitropropane-1,3-Diol (Bronopol) CT - -       19 Lyral - -       20 Tixocortol-21- Pivalate CT - -       21 Diazolidinyl urea (Germall II) - -        22 Methyl Methacrylate - -       23 Cobalt (II) Chloride Hexahydrate - -       24 Fragrance Mix II  - -       25 Compositae Mix II - -       26 Benzoyl Peroxide - -       27 Bacitracin - -       28 Formaldehyde - -       29 Methylchloroisothiazolinone / Methylisothiazolinone - -       30 Corticosteroid Mix CT - -       31 Sodium Lauryl Sulfate - -       32 Lanolin Alcohol - -       33 Turpentine - -       34 Cetylstearylalcohol - -       35 Chlorhexidine Dicluconate - -       36 Budesonide - -       37 Imidazolidinyl Urea  - -       38 Ethyl-2 Cyanoacrylate - -       39 Quaternium 15 (Dowicil 200) - -       40 Decyl Glucoside - -     Compositae Mix II - common yarrow, mountain arnica, Polish chamomile, feverfew, and the common tansy   PRESERVATIVES & ANTIMICROBIALS        # Substance 2 days 4 days remarks   41 1 1,2-Benzisothiazoline-3-One, Sodium Salt - -     2 1,3,5-Maldonado (2-Hydroxyethyl) - Hexahydrotriazine (Grotan BK) - -     3 Dichlorophene - -     4 3, 4, 4' - Triclocarban - -    45 5 4 - Chloro - 3 - Cresol - -     6 4 - Chloro - 4 - Xylenol (PCMX) - -     7 7-Ethylbicyclooxazolidine (Bioban [id number]) - -     8 Benzalkonium Chloride CT - -     9 Benzyl Alcohol - -    50 10 Cetalkonium Chloride - +/++     11 Cetylpyrimidine Chloride  - -     12 Chloroacetamide - -     13 DMDM Hydantoin - -     14 Glutaraldehyde - -    55 15 Triclosan - -     16 Glyoxal Trimeric Dihydrate - -     17 Iodopropynyl Butylcarbamate - -     18 Octylisothiazoline - -     19 Acetophenone Azine - -    60 20 Bioban P 1487 (Nitrobutyl) Morpholine/(Ethylnitro-Trimethylene) Dimorpholine - -     21 Phenoxyethanol - -     22 Phenyl Salicylate - -     23 Povidone Iodine - -     24 Sodium Benzoate - -     65 25 Sodium Disulfite - -     26 Sorbic Acid - +     27 Thimerosal - -     28 Melamine Formaldehyde Resin - -     29 Ethylenediamine Dihydrochloride - -      Parabens      70 30 Butyl-P-Hydroxybenzoate - -     31 Ethyl-P-Hydroxybenzoate - -     32 Methyl-P-Hydroxybenzoate - -    73 33 Propyl-P-Hydroxybenzoate - -     EMULSIFIERS & ADDITIVES       # Substance 2 days 4 days remarks   74 1 Polyethylene Glycol-400 - -    75 2 Cocamidopropyl Betaine - -     3 Amerchol L101 - -     4 Propylene Glycol - -     5 Triethanolamine - -     6 Sorbitane Sesquiolate CT - -    80 7 Isopropylmyristate - -     8 Polysorbate 80 CT - -     9 Amidoamine   (Stearamidopropyl Dimethylamine) - -     10 Oleamidopropyl Dimethylamine - -     11 Lauryl Glucoside - -    85 12 Coconut Diethanolamide  - -     13 2-Hydroxy-4-Methoxy Benzophenone (Oxybenzone) - -     14 Benzophenone-4 (Sulisobenzon) - -     15 Propolis - +     16 Dexpanthenol - -    90 17 Abitol (Hydroabietyl Alcohol) - -     18 Tert-Butylhydroquinone - -     19 Benzyl Salicylate - -     20 Dimethylaminopropylamin (DMPA) - -     21 Zinc Pyrithione (Zinc Omadine)  - -    95 22 Maldonado(Hydroxymethyl) Nitromethane  - -      Antioxidant       23 Dodecyl Gallate - -     24 Butylhydroxyanisole (BHA) - -     25 Butylhydroxytoluene (BHT) - -     26 Di-Alpha-Tocopherol (Vit E) - -    100 27 Propyl Gallate - -     PERFUMES, FLAVORS & PLANTS        # Substance 2 days 4 days remarks   101 1 Benzyl Cinnamate - -     2 Di-Limonene (Dipentene) - -     3 Cananga Odorata (Ylang Ylang) (I) - -     4 Lichen Acid Mix - -    105 5 Mentha Piperita Oil (Peppermint Oil) - -     6 Sesquiterpenelactone mix - -     7 Tea Tree Oil, Oxidized - -     8 Wood Tar Mix - -     9 Abietic Acid - -    110 10 Lavendula Angustifolia Oil (Lavender Oil) - -     11 Fragrance mix II CT * 14% - -      Fragrance Mix I       12 Oakmoss Absolute - -     13 Eugenol - -     14 Geraniol - -    115 15 Hydroxycitronellal - -     16  Isoeugenol - -     17 Cinnamic Aldehyde - -     18 Cinnamic Alcohol  - -      Fragrance mix II       19 Citronellol - -    120 20 Alpha-Hexylcinnamic Aldehyde    - -     21 Citral - -     22 Farnesol - -    123 23 Coumarin - -    Hexylcinnamic aldehyde, Coumarin, Farnesol, Hydroxyisohexy3-cyclohexene carboxaldehyde, citral, citrolellol   HAIRDRESSER        # Substance 2 days 4 days remarks   124 1 P-Phenylenediamine -  -    125 2 P-Aminodiphenylamine - -     3 P-Toluenediamine Sulphate  -  -     4 Ammonium Thioglycolate - -     5 Ammonium Persulfate - -     6 Resorcinol - -    130 7 3-Aminophenol - -     8 P-Aminophenol - -     9 Glyceryl Monothioglycolate - -    133 10 Pyrogallol - -     CORTICOSTEROIDS   # Substance 2 days 4 days remarks Allergy  class   134 1 Amcinonide - -  B   135 2 Betametasone-17,21 Dipropionate - -  D1    3 Desoximetasone - -  C    4 Betamethasone-17-Valerate - -  D1    5 Hydrocortisone - -  A    6 Clobetasol-17-Propionate - -  D1   140 7 Dexamethasone-21-Phosphate Disodium Salt - -  C    8 Hydrocortisone-17 Butyrate - -  D2    9 Prednisolone - -  A    10 Triamcinolone Acetonide - -  B    11 Methylprednisolone Aceponate - -  D2   145 12 Hydrocortisone-21-Acetate - -  A   146 13 Prednicarbate - -  D2     Group Characteristics of group Generic name Name  cross reactions   A Hydrocortisone   Cloprednole, Fludrocortisone acétate, Hydrocortison acetate, Methylprednisolone, Prednisolone, Tixocortolpivalate Alfacortone, Fucidin H, Dermacalm, Hexacortone, Premandole, Imacort With group D2   B Triamcinolone-acetonide   Budenoside (R-isomer), Amcinonide, Desonide, Fluocinolone acetonide, Triamcinolone acetonide Locapred, Locatop  Synalar, Pevisone, Ken[place]ort -   C Betamethasone (Without Rosenda)   Betamethasone, Dexamethasone, Flumethasone pivalate, Halomethasone Daivobet, Dexasalyl, Locasalen,   -   D1 Betamethasone-diproprionate   Betamethasone dipropionate, Betamethasone-17-valerate,  Clobetasole-propionate, Fluticasone propionate, Mometasone furoate Betnovate, Diprogenta, Diprosalic, Diprosone, Celestoderm, Fucicort,  Cutivate, Axotide, Elocom -   D2 Methylprednisolone-aceponate   Hydrocortisone-aceponate, Hydrocortisone-buteprate, Hydrocortisone-17-butyrate, Methylprednisolone aceponate, Prednicarbate Locoïd, Advantan,  Prednitop With group A and Budesonide (S-isomer)      Results of patch tests:                         Interpretation:  - Negative                    A    = Allergic      (+) Erythema    TI   = Toxic/irritant   + E + Infiltration    RaP = Relevance at Present     ++ E/I + Papulovesicle   Rpr  = Relevance Previously     +++ E/I/P + Blister     nR   = No Relevance    [] No relevant allergic reaction observed    [x] Allergic reaction diagnosed against following allergens:    Preservative/additives: + sorbic acid, +/++ Cetalkonium Chloride, + Propolis    Interpretation/ remarks:   The reaction patterns in patch tests for Propolis and Sorbic acid are somehow borderline and for the Cetalkonium chloride a single papule with vesicle. However, they could really play a role in the pathogenesis of the hair problem and therefore I would propose to avoid them. No signs for atopy and therefore atopic dermatitis doesn't seem to be involved.     [x] Patient information given   [x] ACDS CAMP information's (# ccaP3 and [id number]) to following compounds: Sorbic acid, Cetalkonium Chloride, Propolis   [] General information's to following compounds: ......      ____________________________________________    Assessment & Plan:    ==> Final Diagnosis:     # Chronic recurrent hair loss with LPP/FFA  Maybe aggravated by underlying contact allergy = sensitization to preservatives and additives  No signs for atopy  * chronic illness with exacerbation, progression, side effects from treatment    # Atopic predisposition? with:   Positive FHx  No clear signs for atopy based on history and negative prick  tests    These conclusions are made at the best of one's knowledge and belief based on the provided evidence such as patient's history and allergy test results and they can change over time or can be incomplete because of missing information.    ==> Treatment Plan:    >> Follow the recommendations of the CAMP latisha, using only products recommended on the latisha on skin and hair.   - Daily moisturization is encouraged, as it is important to protect the skin barrier.  - Recommend patient check CAMP latisha before using any prescribed topicals, and then if the topical is not recommended, the patient should reach out to the prescribing provider to discuss an alternative treatment.       Staff Involved: Provider and Staff      Follow-up in Derm-Allergy clinic if necessary  - next scheduled to follow up with Dr. Esquivel on 4/25/25 for another ILK injection   ___________________________    I spent a total of 30 minutes with Maria Ines Coreas during today s  visit. This time was spent discussing all the individual test results, correlating them to the clinical relevance, counseling the patient and/or coordinating care. Moreover time was spent to install and explain the CAMP Latisha from [employer] Contact [employer] with the informations on the individual allergens and propositions of products that can be used. Please see Assessment and Plan for additional details.

## 2025-04-25 NOTE — PROGRESS NOTES
[facility] Dermatology Note  Encounter Date: Apr 25, 2025  Office Visit     Dermatology Problem List:  1. FFA/LPP s/p bx 2018  - Current tx:  finasteride 5 mg daily,  mg BID (has annual optho exams for herpes zoster ophthalmicus as well), oral minoxidil 2.5 mg daily  - ILK:  2.5mg on 4/01/22, 5mg on 8/5/22, 10 mg on 10/06/23, 20 mg (02/20/24, 6/14/24), 10mg (10/18/2024), 10mg (4/25/25)  - Adjunct tx: ketoconazole, fluocinonide, Latisse (brows), tofacitinib solution  - Past tx: tacrolimus solution (burning/irritation), naltrexone, topical tofacitinib (caused burning/itch), doxycycline 100 mg BID  - Future considerations: excimer, isotretinoin/acitretin, mycophenolate, methotrexate  - nbUVB device not covered  2. ACD  - ACDS CAMP information's (# [id number] and [id number]) to following compounds: Sorbic acid, Cetalkonium Chloride, Propolis      SHx: [place] in [place]    ____________________________________________    Assessment & Plan:  # FFA/LPP s/p bx 2018, stable  Overall with continued mild-moderate disease activity with ongoing symptoms of intermittent pruritus. Patient report the same amount of shedding since last visit; had a period in February with less shedding, but unsure what may have been associated with the decreased shedding. She is mostly active on mid frontal scalp. Saw Dr. Mclaughlin this week for patch testing and had a few allergies to additives/preservatives (+ sorbic acid, +/++ Cetalkonium Chloride, + Propolis ), so it is possible that contact allergens could be contributing to her hair loss. She re-started doxycycline this last visit, but did not find it to be helpful over the past 6 months. Discussed risks and benefits of further therapy options, including mycophenolate or Methotrexate; she would be open to trying these. For now will try more regular ILK injections (she missed her injections 2 months ago) and avoidance of contact allergens. If she continues to progress can  consider MMF or MTX at her next visit.   - ILK 10 injections performed today. See procedure note below.  - Stop doxycycline 100 mg BID  - Continue hydroxychloroquine 200 mg BID  - last labs 10/2024 reassuring; continue annual eye exams with optho (last 4/2024)  - Continue oral minoxidil 2.5 mg daily  - Continue finasteride 5 mg daily  - Continue ketoconazole 2% shampoo 3 times per week, alternating with T-sal shampoo  - Continue fluocinonide solution nightly   - Continue to avoid contact allergens (sorbic acid,Cetalkonium Chloride,  Propolis) and follow the recommendations of the CAMP phil per Dr. Mclaughlin.   *reviewed allergy note    # ACD, as above.  - noted    Procedures Performed:   See below    Follow-up: 4-5 month(s) in-person, or earlier for new or changing lesions    Staff and Medical Student:     Mariam Peace, MS4    Staff Physician:  I was present with the medical student who participated in the service and in the documentation of the note. I have verified the history and personally performed the physical exam and medical decision making. I agree with the assessment and plan of care as documented in the note.     Medical Student above participated in this procedure. I was present for the procedure and personally supervised the medical student as he/she performed some of the ILK.   - Intra-lesional triamcinolone procedure note. After verbal consent and review of risk of pain and skin thinning/atrophy, positioning and cleansing with isopropyl alcohol, 2.7 total mL of triamcinolone 10 mg/mL was injected into 27 lesion(s) on the scalp. The patient tolerated the procedure well and left the dermatology clinic in good condition.     Alana Esquivel MD    Department of Dermatology  [facility]: Phone: [phone], Fax: [phone]  4/29/2025    ____________________________________________    CC: Hair Loss (Hair loss  follow up. Unchanged. )    HPI:  Ms. Maria Ines Coreas is a(n) 49 year old female who presents today as a new patient for FFF/LPP. She was last seen on 10/18/24 at which time she did ILK injections and immunosuppressive therapy was considered. She feels that the hair loss is about the same - continues to have the same amount of shedding. No change in eyebrow or lash density. She is currently flaring on her scalp right now an d describes it as very itchy and flaking on the center of her scalp. She thinks that this is because she did the patch testing with  this week so has not showered at all this week. She also missed ILK injections in [place] (she alternates) in February so has not had them for 4 months. She is interested in ILK injections today as she felt that it was helpful     Patient is otherwise feeling well, without additional skin concerns.    Labs:  HIV, Hep C ab, Hep B surface ag, Hep B core ab, Tb, CBC , and CMP  reviewed.    Physical Exam:  Vitals: There were no vitals taken for this visit.  SKIN: Scalp exam was performed.  - There is erythema and perifollicular scale most prominent on frontal midline scalp and frontal parietal scalp, erythema is less than last visit  - Frontotemporal band of alopecia, with some maykel fibers of regrowth.  - No other lesions of concern on areas examined.                       Medications:  Current Outpatient Medications   Medication Sig Dispense Refill    bimatoprost (LUMIGAN) 0.03 % ophthalmic solution Apply to eyebrows nightly 5 mL 6    COMPOUNDED NON-CONTROLLED SUBSTANCE (CMPD RX) - PHARMACY TO MIX COMPOUNDED MEDICATION Apply to scalp twice daily as needed 10 mL 3    doxycycline monohydrate (MONODOX) 100 MG capsule Take 1 capsule (100 mg) by mouth 2 times daily. 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 30 g 3    hydroxychloroquine (PLAQUENIL) 200 MG tablet Take 1 tablet (200 mg) by mouth 2 times daily 180 tablet 3    ketoconazole (NIZORAL) 2 % external shampoo Apply topically daily as needed for itching or irritation 120 mL 11    minoxidil (LONITEN) 2.5 MG tablet Take 1 tablet by mouth once daily 90 tablet 3    prednisoLONE acetate (PRED FORTE) 1 % ophthalmic suspension INSTILL 1 DROP INTO EACH EYE THREE TIMES A WEEK      tacrolimus (PROTOPIC) 0.1 % external ointment Apply 1-2 times daily to scalp. 60 g 6    valACYclovir (VALTREX) 500 MG tablet Take 500 mg by mouth daily       Current Facility-Administered Medications   Medication Dose Route Frequency Provider Last Rate Last Admin    triamcinolone acetonide (KENALOG-10) injection 10 mg  10 mg Intra-Lesional Once Renee Aparicio MD        triamcinolone acetonide (KENALOG-10) injection 5 mg  5 mg Intra-Lesional Once Renee Aparicio MD          Past Medical History:   There is no problem list on file for this patient.    No past medical history on file.     CC Bella Harp NP  [facility]  [address] [place] [address] on close of this encounter.

## 2025-04-25 NOTE — LETTER
4/25/2025      Maria Ines Coreas  [address] [place] [address]      Dear Colleague,    Thank you for referring your patient, Maria Ines Coreas, to the [facility] [place]. Please see a copy of my visit note below.    [facility] Dermato-allergology Note  Office visit  Encounter Date: Apr 25, 2025  ____________________________________________    CC: Allergy Testing Followup (Patch day 5)    HPI:  (Apr 25, 2025)  Ms. Maria Ines Coreas is a(n) 49 year old female who presents today as a return patient for allergy tests as planned  - in clinic for final readings and conclusions  - Otherwise feeling well in usual state of health    Physical Exam:  General: In no acute distress, well-developed, well-nourished  Eyes: no conjunctivitis  ENT: no signs of rhinitis   Pulmonary: no wheezing or coughing  Skin: Focused examination of the skin on test sites was performed = see test results below  No active eczematous skin lesions on tests sites, particularly back    Earlier History and Allergy Exams:  (Apr 23, 2025)  Presents today as a return patient for allergy tests as planned  - Follow-up in Derm-Allergy clinic for 1st readings of patch tests after 2 days     (Apr 21, 2025)  Presents today as a return patient for allergy tests as planned  - Follow-up in Derm-Allergy clinic for patch tests in 4/2025 or 5/2025  Day 3 of patch tests can be virtual  If she follows up with Dr. Esquivel around that time, would recommend she schedule follow up for after day 5 appointment time and then she can do repeat ILK injections if still needed     (Oct 18, 2024)  Presents today as new patient for allergy consultation  - Referred by Dr. Esquivel on 6/14/24 for LPP/FFA with stable hair loss but persistent erythema/scale and pruritus so wanting to rule out allergic contact dermatitis as a component  - Scheduled to follow up with Dr. Esquivel at 09:30  - Has had scalp issues for  approaching 10 years  - Frontal scalp is itchy and sometimes scaly and dry  - Has never noticed involvement on any other part of back  - No other skin involvement     Skin: Focused examination of the skin on test sites was performed = see test results below  Focused examination of the scalp was performed.  - On the front scalp, no signs of inflammation or desquamation.   - On the occipital scalp, no signs of erythema or desquamation.    Past Medical History:   There is no problem list on file for this patient.    No past medical history on file.    Allergies:  No Known Allergies    Medications:  Current Outpatient Medications   Medication Sig Dispense Refill     bimatoprost (LUMIGAN) 0.03 % ophthalmic solution Apply to eyebrows nightly 5 mL 6     COMPOUNDED NON-CONTROLLED SUBSTANCE (CMPD RX) - PHARMACY TO MIX COMPOUNDED MEDICATION Apply to scalp twice daily as needed 10 mL 3     doxycycline monohydrate (MONODOX) 100 MG capsule Take 1 capsule (100 mg) by mouth 2 times daily. 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 30 g 3     hydroxychloroquine (PLAQUENIL) 200 MG tablet Take 1 tablet (200 mg) by mouth 2 times daily 180 tablet 3     ketoconazole (NIZORAL) 2 % external shampoo Apply topically daily as needed for itching or irritation 120 mL 11     minoxidil (LONITEN) 2.5 MG tablet Take 1 tablet by mouth once daily 90 tablet 3     prednisoLONE acetate (PRED FORTE) 1 % ophthalmic suspension INSTILL 1 DROP INTO EACH EYE THREE TIMES A WEEK       tacrolimus (PROTOPIC) 0.1 % external ointment Apply 1-2 times daily to scalp. 60 g 6     valACYclovir (VALTREX) 500 MG tablet Take 500 mg by mouth daily       Current Facility-Administered Medications   Medication Dose Route Frequency  Provider Last Rate Last Admin     triamcinolone acetonide (KENALOG-10) injection 10 mg  10 mg Intra-Lesional Once Renee Aparicio MD         triamcinolone acetonide (KENALOG-10) injection 5 mg  5 mg Intra-Lesional Once Renee Aparicio MD         Previous Labs, Allergy Tests, Dermatopathology, Imaging:  [Upon review of Epic chart, no prior allergy records as of 10/18/24.]     6/14/24 Dr. Alana Esquivel (derm) - most recent derm visit in [facility]  DERM PROBLEM LIST:  1. 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 She tried the tacrolimus solution but discontinued due to burning and irritation. Not sure if she notices much difference with the doxycycline  - Continues doxycycline 100 mg BID, hydroxychloroquine 200 mg BID (last eye exam in April), oral minoxidil 2.5 mg daily, finasteride 5 mg daily, Lidex solution, and keto shampoo & T-Sal shampoo (alternates; washes hair daily).   - Does continue to notices hair loss and inflammation. Today is a relatively good day, but sometimes the pruritus can be pretty bad. Also has associated hair loss of eyebrows and eyelashes.   - ILK helps, but she does notice some atrophy. She says her local derm has been doing ILK 5 instead of 10 and that seems to have similar efficacy with less atrophy    FROM A&P:  # FFA/LPP s/p bx 2018  Overall with continued mild-moderate disease activity but fairly  stable in comparison to prior photos. Also with ongoing symptoms of intermittent pruritus. Discussed the risks and benefits of further therapy options, including mycophenolate, tofacitinib solution, or switching finasteride to dutasteride. Also reviewed the possibility of allergic contact dermatitis given evidence of persistent inflammation but stable hair loss.  - ILK 10 injections performed today (will plan to go down to ILK 5 in the future). See procedure note below.  - Referral placed for patch testing to r/o ACD component  - Stop doxycycline given lack of significant response  - Start tofacitinib solution BID as needed (to [place])  - Continue hydroxychloroquine 200 mg BID  - Repeat safety labs for hydroxychloroquine (CBC and CMP); continue annual eye exams with optho (last 4/2024)  We forgot to have pt go down to lab so will contact her and see if she wants to get labs closer to home  - Continue oral minoxidil 2.5 mg daily  - Continue finasteride 5 mg daily  - Continue ketoconazole 2% shampoo 3 times per week, alternating with T-sal shampoo  - Continue fluocinonide solution nightly        Referred By: Alana Esquivel MD (derm)  [address] [place] [address]     Allergy Tests:  Past Allergy Test    Family History:  Family History   Problem Relation Age of Onset     Skin Cancer Father      Dad and now her son (16 y/o) with very dry, cracking hands.  Mom has pollen allergies/hay fever.  Daughter has runny/drippy nose and takes allergy medication regularly.    Social History:  The patient works as an , currently in more of a supervisory position. Patient has the following hobbies or non-occupational exposure: run.  Does not work with paint/art work.    Order for Future Allergy Testing:    [x] Outpatient  [] Inpatient: [facility]..../ Bed ...    Skin Atopy (atopic dermatitis)? [] Yes     [x] No  Comments:   Childhood eczema?   [] Yes     [x] No  Comments:   Hand eczema?   [] Yes     [x] No  If yes,  leading hand? [] Right     [] Left     [] Ambidextrous  Comments:     Contact allergies?   [x] Yes     [] No  Comments: adds hi-lites to her hair  Including adhesives/bandages? [] Yes     [x] No  Comments:   Including metals?   [x] Yes     [] No  Comments:   - wears a few rings currently  - used to have more ear piercings, but ears would get red and inflamed, so stopped wearing them    Drug allergies?   [] Yes     [x] No  Comments:     Angioedema?   [] Yes     [x] No  Comments:     Urticaria?   [x] Yes     [] No  Comments: has at some point in her life, rarely and a handful of time, did not last long and she is unaware of trigger    Food allergies?   [] Yes     [x] No  Comments:     Pet allergies?   [] Yes     [x] No  Comments: has 1 dog and 1 cat; has had dog and/or cat for >20 years, and has never noticed reactions around them    Environmental allergy symptoms?  [] Conjunctivitis  [] Otitis  [] Pharyngitis  [] Polyposis  [] Postnasal Drip  [] Rhinitis  [] Sinusitis  [x] None  Comments: many years ago, only had to take an allergy pill for 1 year when she had watery feeling in ears    HENT Operations?  [] Adenoids [] Septum [] Sinus  [] Tonsils        [x] Other: wisdom teeth extraction  [] None  Comments:     Pulmonary symptoms (from birth to present)?  [] Asthma bronchiale  Inhaler(s)?:   [] Coughing  [] Other  [x] None  Comments:     Environmental and pulmonary symptoms aggravated by?  Season: [x] None     [] I     [] II     [] III     [] IV     [] V     [] VI          [] VII     [] VIII     [] IX     [] X     [] XI     [] XII     [] Perennial  Time of Day: [x] None     [] Morning     [] Noon     [] Evening     [] Night     [] Whole Day  Location/Changes: [x] None     [] Inside     [] Outside     [] Mountain     [] Sea     [] Other:   Triggers (specific): [x] None     [] Animals     [] Dust     [] Mold     [] Plants     [] Other:   Triggers (other): [x] None     [] Psyche     [] Sport     [] Work     [] Other:    Irritant: [x] None     [] Cold     [] Heat     [] Odors     [] Physical Efforts     [] Smoke     [] Other:     Order for PATCH TESTS  Reason for tests (suspected allergy): ruling out ACD component of chronic, recurrent, therapy-resistant LPP/FFA  Known previous allergies: none  Standardized panels  [x] Standard panel (40 tests)  [x] Preservatives & Antimicrobials (31 tests)  [x] Emulsifiers & Additives (25 tests)   [x] Perfumes/Flavours & Plants (25 tests)  [x] Hairdresser panel (12 tests)  [] Rubber Chemicals (22 tests)  [] Plastics (26 tests)  [] Colorants/Dyes/Food additives (20 tests)  [] Metals (implants/dental) (24 tests)  [] Local anaesthetics/NSAIDs (13 tests)  [] Antibiotics & Antimycotics (14 tests)   [x] Corticosteroids (15 tests)   [] Photopatch test (62 tests)   [] Others:     [] Patient's Own Products:   DO NOT test if chemical or biological identity is unknown!   always ask from patient the product information and safety sheets (MSDS)       Order for PRICK TESTS    Reason for tests (suspected allergy): atopy? No clear signs in history other than positive FHx  Known previous allergies: none    Standardized prick panels  [x] Atopic panel (20 tests)  [] Pediatric Panel (12 tests)  [] Milk, Meat, Eggs, Grains (20 tests)   [] Dust, Epithelia, Feathers (10 tests)  [] Fish, Seafood, Shellfish (17 tests)  [] Nuts, Beans (14 tests)  [] Spice, Vegetable, Fruit (17 tests)  [] Pollen Panel = Tree, Grass, Weed (24 tests)  [] Others:     [] Patient's Own Products:   DO NOT test if chemical or biological identity is unknown!   always ask from patient the product information and safety sheets (MSDS)     Standardized intradermal tests  [] Alternaria alternata  [] Aspergillus fumigatus  [] Cladosporium herbarum   [] Penicillium notatum  [] Dermatophagoides farinae  [] Dermatophagoides pteronyssinus  [] Dog Epithelium  [] Cat Epithelium  [] Others:     [] Bee venom   [] Wasp venom  !!Specific protocol with dilutions!!        Order for Drug allergy tests (prick & intradermal & patch tests)    [] Penicillin G     [] Ampicillin   [] Cefazolin        [] Ceftriaxone     [] Ceftazidime     [] Cefepime     [] Cefuroxime  [] Bactrim  [] Iodixanol     [] Iopamidol        [] Iohexol  [] Others:   [] Patient's Own:   Order for N/A as test date      Atopy Screen (Placed Oct 18, 2024)  No Substance Readings (15 min) Evaluation   POS Histamine 1mg/ml ++    NEG NaCl 0.9% -      No Substance Readings (15 min) Evaluation   1 Alternaria alternata (tenuis)  -    2 Cladosporium herbarum -    3 Aspergillus fumigatus -    4 Penicillium notatum -    5 Dermatophagoides pteronyssinus -    6 Dermatophagoides farinae -    7 Dog epithelium (canis spp) -    8 Cat hair (phi catus) -    9 Cockroach   (Blatella americana & germanica) -    10 Grass mix midwest   (Chantal, Orchard, Redtop, Patel) -    11 Gaurang grass (sorghum halepense) -    12 Weed mix   (common Cocklebur, Lamb s quarters, rough redroot Pigweed, Dock/Sorrel) -    13 Mug wort (artemisia vulgare) -    14 Ragweed giant/short (ambrosia spp) -    15 White birch (Betula papyrifera) -    16 Tree mix 1 (Pecan, Maple BHR, Oak RVW, american Leipsic, black [place]) -    17 Red cedar (juniperus virginia) -    18 Tree mix 2   (white [place], river/red Birch, black [place], common Coker, american Elm) -    19 Box elder/Maple mix (acer spp) -    20 Sherman shagbark (carya ovata) -    Conclusion:     RESULTS & EVALUATION of PATCH TESTS    Apr 21, 2025 application of patch tests:    Patch test readings after     [x] 2 days, [] 3 days [x] 4 days, [] 5 days,  Other duration: ...     STANDARD Series                                          # Substance 2 days 4 days remarks     1 Graham Mix III 10% - -       2 Colophony - -       3  2-Mercaptobenzothiazole  - -       4 Methylisothiazolinone - -       5 Carba Mix - -       6 Thiuram Mix [A] - -       7 Bisphenol A Epoxy Resin - -       8  B-Mugn-Msdaynltqkz-Formaldehyde Resin - -       9 Mercapto Mix [A] - -       10 Black Rubber Mix- PPD [B] - -       11 Potassium Dichromate  -  -       12 Balsam of [place] (Myroxylon Pereirae Resin) ? -       13 Nickel Sulphate Hexahydrate - -       14 Mixed Dialkyl Thiourea - -       15 Paraben Mix [B] - -       16 Methyldibromo Glutaronitrile - -       17 Fragrance Mix 8% - -       18 2-Bromo-2-Nitropropane-1,3-Diol (Bronopol) CT - -       19 Lyral - -       20 Tixocortol-21- Pivalate CT - -       21 Diazolidinyl urea (Germall II) - -        22 Methyl Methacrylate - -       23 Cobalt (II) Chloride Hexahydrate - -       24 Fragrance Mix II  - -       25 Compositae Mix II - -       26 Benzoyl Peroxide - -       27 Bacitracin - -       28 Formaldehyde - -       29 Methylchloroisothiazolinone / Methylisothiazolinone - -       30 Corticosteroid Mix CT - -       31 Sodium Lauryl Sulfate - -       32 Lanolin Alcohol - -       33 Turpentine - -       34 Cetylstearylalcohol - -       35 Chlorhexidine Dicluconate - -       36 Budesonide - -       37 Imidazolidinyl Urea  - -       38 Ethyl-2 Cyanoacrylate - -       39 Quaternium 15 (Dowicil 200) - -       40 Decyl Glucoside - -     Compositae Mix II - common yarrow, mountain arnica, Maori chamomile, feverfew, and the common tansy   PRESERVATIVES & ANTIMICROBIALS        # Substance 2 days 4 days remarks   41 1 1,2-Benzisothiazoline-3-One, Sodium Salt - -     2 1,3,5-Maldonado (2-Hydroxyethyl) - Hexahydrotriazine (Grotan BK) - -     3 Dichlorophene - -     4 3, 4, 4' - Triclocarban - -    45 5 4 - Chloro - 3 - Cresol - -     6 4 - Chloro - 4 - Xylenol (PCMX) - -     7 7-Ethylbicyclooxazolidine (Bioban OP3191) - -     8 Benzalkonium Chloride CT - -     9 Benzyl Alcohol - -    50 10 Cetalkonium Chloride - +/++     11 Cetylpyrimidine Chloride  - -     12 Chloroacetamide - -     13 DMDM Hydantoin - -     14 Glutaraldehyde - -    55 15 Triclosan - -     16 Glyoxal Trimeric Dihydrate - -      17 Iodopropynyl Butylcarbamate - -     18 Octylisothiazoline - -     19 Acetophenone Azine - -    60 20 Bioban P 1487 (Nitrobutyl) Morpholine/(Ethylnitro-Trimethylene) Dimorpholine - -     21 Phenoxyethanol - -     22 Phenyl Salicylate - -     23 Povidone Iodine - -     24 Sodium Benzoate - -    65 25 Sodium Disulfite - -     26 Sorbic Acid - +     27 Thimerosal - -     28 Melamine Formaldehyde Resin - -     29 Ethylenediamine Dihydrochloride - -      Parabens      70 30 Butyl-P-Hydroxybenzoate - -     31 Ethyl-P-Hydroxybenzoate - -     32 Methyl-P-Hydroxybenzoate - -    73 33 Propyl-P-Hydroxybenzoate - -     EMULSIFIERS & ADDITIVES       # Substance 2 days 4 days remarks   74 1 Polyethylene Glycol-400 - -    75 2 Cocamidopropyl Betaine - -     3 Amerchol L101 - -     4 Propylene Glycol - -     5 Triethanolamine - -     6 Sorbitane Sesquiolate CT - -    80 7 Isopropylmyristate - -     8 Polysorbate 80 CT - -     9 Amidoamine   (Stearamidopropyl Dimethylamine) - -     10 Oleamidopropyl Dimethylamine - -     11 Lauryl Glucoside - -    85 12 Coconut Diethanolamide  - -     13 2-Hydroxy-4-Methoxy Benzophenone (Oxybenzone) - -     14 Benzophenone-4 (Sulisobenzon) - -     15 Propolis - +     16 Dexpanthenol - -    90 17 Abitol (Hydroabietyl Alcohol) - -     18 Tert-Butylhydroquinone - -     19 Benzyl Salicylate - -     20 Dimethylaminopropylamin (DMPA) - -     21 Zinc Pyrithione (Zinc Omadine)  - -    95 22 Maldonado(Hydroxymethyl) Nitromethane  - -      Antioxidant       23 Dodecyl Gallate - -     24 Butylhydroxyanisole (BHA) - -     25 Butylhydroxytoluene (BHT) - -     26 Di-Alpha-Tocopherol (Vit E) - -    100 27 Propyl Gallate - -     PERFUMES, FLAVORS & PLANTS        # Substance 2 days 4 days remarks   101 1 Benzyl Cinnamate - -     2 Di-Limonene (Dipentene) - -     3 Cananga Odorata (Ylang Ylang) (I) - -     4 Lichen Acid Mix - -    105 5 Mentha Piperita Oil (Peppermint Oil) - -     6 Sesquiterpenelactone mix - -     7  Tea Tree Oil, Oxidized - -     8 Wood Tar Mix - -     9 Abietic Acid - -    110 10 Lavendula Angustifolia Oil (Lavender Oil) - -     11 Fragrance mix II CT * 14% - -      Fragrance Mix I       12 [place] Absolute - -     13 Eugenol - -     14 Geraniol - -    115 15 Hydroxycitronellal - -     16 Isoeugenol - -     17 Cinnamic Aldehyde - -     18 Cinnamic Alcohol  - -      Fragrance mix II       19 Citronellol - -    120 20 Alpha-Hexylcinnamic Aldehyde    - -     21 Citral - -     22 Farnesol - -    123 23 Coumarin - -    Hexylcinnamic aldehyde, Coumarin, Farnesol, Hydroxyisohexy3-cyclohexene carboxaldehyde, citral, citrolellol   HAIRDRESSER        # Substance 2 days 4 days remarks   124 1 P-Phenylenediamine -  -    125 2 P-Aminodiphenylamine - -     3 P-Toluenediamine Sulphate  -  -     4 Ammonium Thioglycolate - -     5 Ammonium Persulfate - -     6 Resorcinol - -    130 7 3-Aminophenol - -     8 P-Aminophenol - -     9 Glyceryl Monothioglycolate - -    133 10 Pyrogallol - -     CORTICOSTEROIDS   # Substance 2 days 4 days remarks Allergy  class   134 1 Amcinonide - -  B   135 2 Betametasone-17,21 Dipropionate - -  D1    3 Desoximetasone - -  C    4 Betamethasone-17-Valerate - -  D1    5 Hydrocortisone - -  A    6 Clobetasol-17-Propionate - -  D1   140 7 Dexamethasone-21-Phosphate Disodium Salt - -  C    8 Hydrocortisone-17 Butyrate - -  D2    9 Prednisolone - -  A    10 Triamcinolone Acetonide - -  B    11 Methylprednisolone Aceponate - -  D2   145 12 Hydrocortisone-21-Acetate - -  A   146 13 Prednicarbate - -  D2     Group Characteristics of group Generic name Name  cross reactions   A Hydrocortisone   Cloprednole, Fludrocortisone acétate, Hydrocortison acetate, Methylprednisolone, Prednisolone, Tixocortolpivalate Alfacortone, Fucidin H, Dermacalm, Hexacortone, Premandole, Imacort With group D2   B Triamcinolone-acetonide   Budenoside (R-isomer), Amcinonide, Desonide, Fluocinolone acetonide, Triamcinolone acetonide  Locapred, Locatop  Synalar, Pevisone, Kenacort -   C Betamethasone (Without Rosenda)   Betamethasone, Dexamethasone, Flumethasone pivalate, Halomethasone Daivobet, Dexasalyl, Locasalen,   -   D1 Betamethasone-diproprionate   Betamethasone dipropionate, Betamethasone-17-valerate, Clobetasole-propionate, Fluticasone propionate, Mometasone furoate Betnovate, Diprogenta, Diprosalic, Diprosone, Celestoderm, Fucicort,  Cutivate, Axotide, Elocom -   D2 Methylprednisolone-aceponate   Hydrocortisone-aceponate, Hydrocortisone-buteprate, Hydrocortisone-17-butyrate, Methylprednisolone aceponate, Prednicarbate Locoïd, Advantan,  Prednitop With group A and Budesonide (S-isomer)      Results of patch tests:                         Interpretation:  - Negative                    A    = Allergic      (+) Erythema    TI   = Toxic/irritant   + E + Infiltration    RaP = Relevance at Present     ++ E/I + Papulovesicle   Rpr  = Relevance Previously     +++ E/I/P + Blister     nR   = No Relevance    [] No relevant allergic reaction observed    [x] Allergic reaction diagnosed against following allergens:    Preservative/additives: + sorbic acid, +/++ Cetalkonium Chloride, + Propolis    Interpretation/ remarks:   The reaction patterns in patch tests for Propolis and Sorbic acid are somehow borderline and for the Cetalkonium chloride a single papule with vesicle. However, they could really play a role in the pathogenesis of the hair problem and therefore I would propose to avoid them. No signs for atopy and therefore atopic dermatitis doesn't seem to be involved.     [x] Patient information given   [x] ACDS CAMP information's (# ccaP3 and [id number]) to following compounds: Sorbic acid, Cetalkonium Chloride, Propolis   [] General information's to following compounds: ......      ____________________________________________    Assessment & Plan:    ==> Final Diagnosis:     # Chronic recurrent hair loss with LPP/FFA  Maybe aggravated by underlying  contact allergy = sensitization to preservatives and additives  No signs for atopy  * chronic illness with exacerbation, progression, side effects from treatment    # Atopic predisposition? with:   Positive FHx  No clear signs for atopy based on history and negative prick tests    These conclusions are made at the best of one's knowledge and belief based on the provided evidence such as patient's history and allergy test results and they can change over time or can be incomplete because of missing information.    ==> Treatment Plan:    >> Follow the recommendations of the CAMP latisha, using only products recommended on the latisha on skin and hair.   - Daily moisturization is encouraged, as it is important to protect the skin barrier.  - Recommend patient check CAMP latisha before using any prescribed topicals, and then if the topical is not recommended, the patient should reach out to the prescribing provider to discuss an alternative treatment.       Staff Involved: Provider and Staff      Follow-up in Derm-Allergy clinic if necessary  - next scheduled to follow up with Dr. Esquivel on 4/25/25 for another ILK injection   ___________________________    I spent a total of 30 minutes with Maria Ines Coreas during today s  visit. This time was spent discussing all the individual test results, correlating them to the clinical relevance, counseling the patient and/or coordinating care. Moreover time was spent to install and explain the CAMP Latisha from [employer] Contact [employer]erm[employer] with the informations on the individual allergens and propositions of products that can be used. Please see Assessment and Plan for additional details.         Again, thank you for allowing me to participate in the care of your patient.        Sincerely,        Walt Mclaughlin MD    Electronically signed

## 2025-04-25 NOTE — NURSING NOTE
Dermatology Rooming Note    Maria Ines Coreas's goals for this visit include:   Chief Complaint   Patient presents with    Hair Loss     Hair loss follow up. Unchanged.      Diamante Farias RN

## 2025-04-25 NOTE — NURSING NOTE
Chief Complaint   Patient presents with    Allergy Testing Followup     Patch day 5     Robbi Bowling RN

## 2025-04-25 NOTE — PATIENT INSTRUCTIONS
Allergic reaction diagnosed against following allergens:     Preservative/additives: + sorbic acid, +/++ Cetalkonium Chloride, + Propolis     Interpretation/ remarks:   The reaction patterns in patch tests for Propolis and Sorbic acid are somehow borderline and for the Cetalkonium chloride a single papule with vesicle. However, they could really play a role in the pathogenesis of the hair problem and therefore I would propose to avoid them. No signs for atopy and therefore atopic dermatitis doesn't seem to be involved.      [x] Patient information given                       [x] ACDS CAMP information's (# ccaP3 and X8D8T) to following compounds: Sorbic acid, Cetalkonium Chloride, Propolis                       [] General information's to following compounds: ......        ____________________________________________     Assessment & Plan:     ==> Final Diagnosis:      # Chronic recurrent hair loss with LPP/FFA  Maybe aggravated by underlying contact allergy = sensitization to preservatives and additives  No signs for atopy  * chronic illness with exacerbation, progression, side effects from treatment     # Atopic predisposition? with:   Positive FHx  No clear signs for atopy based on history and negative prick tests     These conclusions are made at the best of one's knowledge and belief based on the provided evidence such as patient's history and allergy test results and they can change over time or can be incomplete because of missing information.     ==> Treatment Plan:     >> Follow the recommendations of the CAMP phil, using only products recommended on the phil on skin and hair.   - Daily moisturization is encouraged, as it is important to protect the skin barrier.  - Recommend patient check CAMP phil before using any prescribed topicals, and then if the topical is not recommended, the patient should reach out to the prescribing provider to discuss an alternative treatment.

## 2025-04-25 NOTE — LETTER
4/25/2025       RE: Maria Ines Coreas  [address] [place] [address]     Dear Colleague,    Thank you for referring your patient, Maria Ines Coreas, to the [facility] [place] at [facility]. Please see a copy of my visit note below.    [facility] Dermatology Note  Encounter Date: Apr 25, 2025  Office Visit     Dermatology Problem List:  1. FFA/LPP s/p bx 2018  - Current tx:  finasteride 5 mg daily,  mg BID (has annual optho exams for herpes zoster ophthalmicus as well), oral minoxidil 2.5 mg daily  - ILK:  2.5mg on 4/01/22, 5mg on 8/5/22, 10 mg on 10/06/23, 20 mg (02/20/24, 6/14/24), 10mg (10/18/2024), 10mg (4/25/25)  - Adjunct tx: ketoconazole, fluocinonide, Latisse (brows), tofacitinib solution  - Past tx: tacrolimus solution (burning/irritation), naltrexone, topical tofacitinib (caused burning/itch), doxycycline 100 mg BID  - Future considerations: excimer, isotretinoin/acitretin, mycophenolate, methotrexate  - nbUVB device not covered  2. ACD  - ACDS CAMP information's (# ccaP3 and X8D8T) to following compounds: Sorbic acid, Cetalkonium Chloride, Propolis      SHx: [place] in [place]    ____________________________________________    Assessment & Plan:  # FFA/LPP s/p bx 2018, stable  Overall with continued mild-moderate disease activity with ongoing symptoms of intermittent pruritus. Patient report the same amount of shedding since last visit; had a period in February with less shedding, but unsure what may have been associated with the decreased shedding. She is mostly active on mid frontal scalp. Saw Dr. Mclaughlin this week for patch testing and had a few allergies to additives/preservatives (+ sorbic acid, +/++ Cetalkonium Chloride, + Propolis ), so it is possible that contact allergens could be contributing to her hair loss. She re-started doxycycline this last visit, but did not find it to  be helpful over the past 6 months. Discussed risks and benefits of further therapy options, including mycophenolate or Methotrexate; she would be open to trying these. For now will try more regular ILK injections (she missed her injections 2 months ago) and avoidance of contact allergens. If she continues to progress can consider MMF or MTX at her next visit.   - ILK 10 injections performed today. See procedure note below.  - Stop doxycycline 100 mg BID  - Continue hydroxychloroquine 200 mg BID  - last labs 10/2024 reassuring; continue annual eye exams with optho (last 4/2024)  - Continue oral minoxidil 2.5 mg daily  - Continue finasteride 5 mg daily  - Continue ketoconazole 2% shampoo 3 times per week, alternating with T-sal shampoo  - Continue fluocinonide solution nightly   - Continue to avoid contact allergens (sorbic acid,Cetalkonium Chloride,  Propolis) and follow the recommendations of the CAMP phil per Dr. Mclaughlin.   *reviewed allergy note    # ACD, as above.  - noted    Procedures Performed:   See below    Follow-up: 4-5 month(s) in-person, or earlier for new or changing lesions    Staff and Medical Student:     Mariam Peace, MS4    Staff Physician:  I was present with the medical student who participated in the service and in the documentation of the note. I have verified the history and personally performed the physical exam and medical decision making. I agree with the assessment and plan of care as documented in the note.     Medical Student above participated in this procedure. I was present for the procedure and personally supervised the medical student as he/she performed some of the ILK.   - Intra-lesional triamcinolone procedure note. After verbal consent and review of risk of pain and skin thinning/atrophy, positioning and cleansing with isopropyl alcohol, 2.7 total mL of triamcinolone 10 mg/mL was injected into 27 lesion(s) on the scalp. The patient tolerated the procedure well and left the  dermatology clinic in good condition.     Alana Esquivel MD    Department of Dermatology  [facility]: Phone: [phone], Fax: [phone]  4/29/2025    ____________________________________________    CC: Hair Loss (Hair loss follow up. Unchanged. )    HPI:  Ms. Maria Ines Coreas is a(n) 49 year old female who presents today as a new patient for FFF/LPP. She was last seen on 10/18/24 at which time she did ILK injections and immunosuppressive therapy was considered. She feels that the hair loss is about the same - continues to have the same amount of shedding. No change in eyebrow or lash density. She is currently flaring on her scalp right now an d describes it as very itchy and flaking on the center of her scalp. She thinks that this is because she did the patch testing with  this week so has not showered at all this week. She also missed ILK injections in [place] (she alternates) in February so has not had them for 4 months. She is interested in ILK injections today as she felt that it was helpful     Patient is otherwise feeling well, without additional skin concerns.    Labs:  HIV, Hep C ab, Hep B surface ag, Hep B core ab, Tb, CBC , and CMP  reviewed.    Physical Exam:  Vitals: There were no vitals taken for this visit.  SKIN: Scalp exam was performed.  - There is erythema and perifollicular scale most prominent on frontal midline scalp and frontal parietal scalp, erythema is less than last visit  - Frontotemporal band of alopecia, with some maykel fibers of regrowth.  - No other lesions of concern on areas examined.                       Medications:  Current Outpatient Medications   Medication Sig Dispense Refill     bimatoprost (LUMIGAN) 0.03 % ophthalmic solution Apply to eyebrows nightly 5 mL 6     COMPOUNDED NON-CONTROLLED SUBSTANCE (CMPD RX) - PHARMACY TO MIX COMPOUNDED MEDICATION Apply to scalp  twice daily as needed 10 mL 3     doxycycline monohydrate (MONODOX) 100 MG capsule Take 1 capsule (100 mg) by mouth 2 times daily. 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 30 g 3     hydroxychloroquine (PLAQUENIL) 200 MG tablet Take 1 tablet (200 mg) by mouth 2 times daily 180 tablet 3     ketoconazole (NIZORAL) 2 % external shampoo Apply topically daily as needed for itching or irritation 120 mL 11     minoxidil (LONITEN) 2.5 MG tablet Take 1 tablet by mouth once daily 90 tablet 3     prednisoLONE acetate (PRED FORTE) 1 % ophthalmic suspension INSTILL 1 DROP INTO EACH EYE THREE TIMES A WEEK       tacrolimus (PROTOPIC) 0.1 % external ointment Apply 1-2 times daily to scalp. 60 g 6     valACYclovir (VALTREX) 500 MG tablet Take 500 mg by mouth daily       Current Facility-Administered Medications   Medication Dose Route Frequency Provider Last Rate Last Admin     triamcinolone acetonide (KENALOG-10) injection 10 mg  10 mg Intra-Lesional Once Renee Aparicio MD         triamcinolone acetonide (KENALOG-10) injection 5 mg  5 mg Intra-Lesional Once Renee Aparicio MD          Past Medical History:   There is no problem list on file for this patient.    No past medical history on file.     CC Bella Harp NP  [address] [place] [address] on close of this encounter.      Again, thank you for allowing me to participate in the care of your patient.      Sincerely,    Alana Esquivel MD

## 2025-07-08 ENCOUNTER — MYC REFILL (OUTPATIENT)
Dept: DERMATOLOGY | Facility: CLINIC | Age: 50
End: 2025-07-08
Payer: COMMERCIAL

## 2025-07-08 DIAGNOSIS — L66.10 LICHEN PLANOPILARIS: ICD-10-CM

## 2025-07-09 RX ORDER — BIMATOPROST 0.3 MG/ML
SOLUTION/ DROPS OPHTHALMIC
Qty: 5 ML | Refills: 6 | Status: SHIPPED | OUTPATIENT
Start: 2025-07-09